# Patient Record
Sex: FEMALE | Race: ASIAN | Employment: UNEMPLOYED | URBAN - METROPOLITAN AREA
[De-identification: names, ages, dates, MRNs, and addresses within clinical notes are randomized per-mention and may not be internally consistent; named-entity substitution may affect disease eponyms.]

---

## 2020-06-30 ENCOUNTER — HOSPITAL ENCOUNTER (INPATIENT)
Age: 40
LOS: 2 days | Discharge: HOME | DRG: 287 | End: 2020-07-02
Attending: EMERGENCY MEDICINE | Admitting: INTERNAL MEDICINE
Payer: COMMERCIAL

## 2020-06-30 ENCOUNTER — APPOINTMENT (OUTPATIENT)
Dept: GENERAL RADIOLOGY | Age: 40
DRG: 287 | End: 2020-06-30
Payer: COMMERCIAL

## 2020-06-30 PROBLEM — I47.20 V-TACH: Status: ACTIVE | Noted: 2020-06-30

## 2020-06-30 LAB
ACETAMINOPHEN LEVEL: <5 MCG/ML (ref 10–30)
ALBUMIN SERPL-MCNC: 3.8 G/DL (ref 3.5–5.2)
ALP BLD-CCNC: 52 U/L (ref 35–104)
ALT SERPL-CCNC: 12 U/L (ref 0–32)
AMPHETAMINE SCREEN, URINE: NOT DETECTED
ANION GAP SERPL CALCULATED.3IONS-SCNC: 12 MMOL/L (ref 7–16)
AST SERPL-CCNC: 16 U/L (ref 0–31)
BACTERIA: ABNORMAL /HPF
BARBITURATE SCREEN URINE: NOT DETECTED
BASOPHILS ABSOLUTE: 0.05 E9/L (ref 0–0.2)
BASOPHILS RELATIVE PERCENT: 0.4 % (ref 0–2)
BENZODIAZEPINE SCREEN, URINE: NOT DETECTED
BILIRUB SERPL-MCNC: 0.4 MG/DL (ref 0–1.2)
BILIRUBIN URINE: NEGATIVE
BLOOD, URINE: NEGATIVE
BUN BLDV-MCNC: 7 MG/DL (ref 6–20)
C-REACTIVE PROTEIN: <0.1 MG/DL (ref 0–0.4)
CALCIUM SERPL-MCNC: 8 MG/DL (ref 8.6–10.2)
CANNABINOID SCREEN URINE: NOT DETECTED
CHLORIDE BLD-SCNC: 110 MMOL/L (ref 98–107)
CLARITY: CLEAR
CO2: 20 MMOL/L (ref 22–29)
COCAINE METABOLITE SCREEN URINE: NOT DETECTED
COLOR: YELLOW
CREAT SERPL-MCNC: 0.6 MG/DL (ref 0.5–1)
EOSINOPHILS ABSOLUTE: 0.14 E9/L (ref 0.05–0.5)
EOSINOPHILS RELATIVE PERCENT: 1.2 % (ref 0–6)
ETHANOL: <10 MG/DL (ref 0–0.08)
FENTANYL SCREEN, URINE: NOT DETECTED
GFR AFRICAN AMERICAN: >60
GFR AFRICAN AMERICAN: >60
GFR NON-AFRICAN AMERICAN: >60 ML/MIN/1.73
GFR NON-AFRICAN AMERICAN: >60 ML/MIN/1.73
GLUCOSE BLD-MCNC: 110 MG/DL (ref 74–99)
GLUCOSE BLD-MCNC: 95 MG/DL (ref 74–99)
GLUCOSE URINE: NEGATIVE MG/DL
HCG, URINE, POC: NEGATIVE
HCT VFR BLD CALC: 45.5 % (ref 34–48)
HEMOGLOBIN: 15 G/DL (ref 11.5–15.5)
IMMATURE GRANULOCYTES #: 0.04 E9/L
IMMATURE GRANULOCYTES %: 0.3 % (ref 0–5)
KETONES, URINE: NEGATIVE MG/DL
LEUKOCYTE ESTERASE, URINE: ABNORMAL
LYMPHOCYTES ABSOLUTE: 3.55 E9/L (ref 1.5–4)
LYMPHOCYTES RELATIVE PERCENT: 30.4 % (ref 20–42)
Lab: NORMAL
Lab: NORMAL
MAGNESIUM: 3.3 MG/DL (ref 1.6–2.6)
MCH RBC QN AUTO: 30.7 PG (ref 26–35)
MCHC RBC AUTO-ENTMCNC: 33 % (ref 32–34.5)
MCV RBC AUTO: 93 FL (ref 80–99.9)
METHADONE SCREEN, URINE: NOT DETECTED
MONOCYTES ABSOLUTE: 0.73 E9/L (ref 0.1–0.95)
MONOCYTES RELATIVE PERCENT: 6.3 % (ref 2–12)
NEGATIVE QC PASS/FAIL: NORMAL
NEUTROPHILS ABSOLUTE: 7.15 E9/L (ref 1.8–7.3)
NEUTROPHILS RELATIVE PERCENT: 61.4 % (ref 43–80)
NITRITE, URINE: NEGATIVE
OPIATE SCREEN URINE: NOT DETECTED
OXYCODONE URINE: NOT DETECTED
PDW BLD-RTO: 12.1 FL (ref 11.5–15)
PERFORMED ON: ABNORMAL
PH UA: 5.5 (ref 5–9)
PHENCYCLIDINE SCREEN URINE: NOT DETECTED
PLATELET # BLD: 414 E9/L (ref 130–450)
PMV BLD AUTO: 9.2 FL (ref 7–12)
POC CHLORIDE: 106 MMOL/L (ref 100–108)
POC CREATININE: 0.7 MG/DL (ref 0.5–1)
POC POTASSIUM: 3.1 MMOL/L (ref 3.5–5)
POC SODIUM: 142 MMOL/L (ref 132–146)
POSITIVE QC PASS/FAIL: NORMAL
POTASSIUM SERPL-SCNC: 3.5 MMOL/L (ref 3.5–5)
PRO-BNP: 114 PG/ML (ref 0–125)
PROCALCITONIN: 0.05 NG/ML (ref 0–0.08)
PROTEIN UA: NEGATIVE MG/DL
RBC # BLD: 4.89 E12/L (ref 3.5–5.5)
RBC UA: ABNORMAL /HPF (ref 0–2)
REASON FOR REJECTION: NORMAL
REJECTED TEST: NORMAL
SALICYLATE, SERUM: <0.3 MG/DL (ref 0–30)
SODIUM BLD-SCNC: 142 MMOL/L (ref 132–146)
SPECIFIC GRAVITY UA: <=1.005 (ref 1–1.03)
TOTAL PROTEIN: 6.8 G/DL (ref 6.4–8.3)
TRICYCLIC ANTIDEPRESSANTS SCREEN SERUM: NEGATIVE NG/ML
TROPONIN: <0.01 NG/ML (ref 0–0.03)
UROBILINOGEN, URINE: 0.2 E.U./DL
WBC # BLD: 11.7 E9/L (ref 4.5–11.5)
WBC UA: ABNORMAL /HPF (ref 0–5)

## 2020-06-30 PROCEDURE — 84132 ASSAY OF SERUM POTASSIUM: CPT

## 2020-06-30 PROCEDURE — 96375 TX/PRO/DX INJ NEW DRUG ADDON: CPT

## 2020-06-30 PROCEDURE — 6370000000 HC RX 637 (ALT 250 FOR IP): Performed by: EMERGENCY MEDICINE

## 2020-06-30 PROCEDURE — 84295 ASSAY OF SERUM SODIUM: CPT

## 2020-06-30 PROCEDURE — 84145 PROCALCITONIN (PCT): CPT

## 2020-06-30 PROCEDURE — 6360000002 HC RX W HCPCS: Performed by: EMERGENCY MEDICINE

## 2020-06-30 PROCEDURE — 36415 COLL VENOUS BLD VENIPUNCTURE: CPT

## 2020-06-30 PROCEDURE — 93005 ELECTROCARDIOGRAM TRACING: CPT | Performed by: EMERGENCY MEDICINE

## 2020-06-30 PROCEDURE — 6360000002 HC RX W HCPCS

## 2020-06-30 PROCEDURE — 80053 COMPREHEN METABOLIC PANEL: CPT

## 2020-06-30 PROCEDURE — 85025 COMPLETE CBC W/AUTO DIFF WBC: CPT

## 2020-06-30 PROCEDURE — 2500000003 HC RX 250 WO HCPCS

## 2020-06-30 PROCEDURE — 82565 ASSAY OF CREATININE: CPT

## 2020-06-30 PROCEDURE — 83880 ASSAY OF NATRIURETIC PEPTIDE: CPT

## 2020-06-30 PROCEDURE — 80307 DRUG TEST PRSMV CHEM ANLYZR: CPT

## 2020-06-30 PROCEDURE — 2000000000 HC ICU R&B

## 2020-06-30 PROCEDURE — 71045 X-RAY EXAM CHEST 1 VIEW: CPT

## 2020-06-30 PROCEDURE — 2580000003 HC RX 258: Performed by: EMERGENCY MEDICINE

## 2020-06-30 PROCEDURE — 96365 THER/PROPH/DIAG IV INF INIT: CPT

## 2020-06-30 PROCEDURE — 82435 ASSAY OF BLOOD CHLORIDE: CPT

## 2020-06-30 PROCEDURE — 86140 C-REACTIVE PROTEIN: CPT

## 2020-06-30 PROCEDURE — 96366 THER/PROPH/DIAG IV INF ADDON: CPT

## 2020-06-30 PROCEDURE — 81001 URINALYSIS AUTO W/SCOPE: CPT

## 2020-06-30 PROCEDURE — G0480 DRUG TEST DEF 1-7 CLASSES: HCPCS

## 2020-06-30 PROCEDURE — 84484 ASSAY OF TROPONIN QUANT: CPT

## 2020-06-30 PROCEDURE — 83735 ASSAY OF MAGNESIUM: CPT

## 2020-06-30 PROCEDURE — 82947 ASSAY GLUCOSE BLOOD QUANT: CPT

## 2020-06-30 PROCEDURE — 99291 CRITICAL CARE FIRST HOUR: CPT

## 2020-06-30 RX ORDER — METOPROLOL TARTRATE 5 MG/5ML
INJECTION INTRAVENOUS
Status: COMPLETED
Start: 2020-06-30 | End: 2020-06-30

## 2020-06-30 RX ORDER — METOPROLOL TARTRATE 5 MG/5ML
2.5 INJECTION INTRAVENOUS ONCE
Status: COMPLETED | OUTPATIENT
Start: 2020-06-30 | End: 2020-06-30

## 2020-06-30 RX ORDER — POTASSIUM CHLORIDE 7.45 MG/ML
10 INJECTION INTRAVENOUS ONCE
Status: COMPLETED | OUTPATIENT
Start: 2020-06-30 | End: 2020-06-30

## 2020-06-30 RX ORDER — 0.9 % SODIUM CHLORIDE 0.9 %
1000 INTRAVENOUS SOLUTION INTRAVENOUS ONCE
Status: COMPLETED | OUTPATIENT
Start: 2020-06-30 | End: 2020-06-30

## 2020-06-30 RX ORDER — ACETAMINOPHEN 650 MG/1
650 SUPPOSITORY RECTAL EVERY 6 HOURS PRN
Status: DISCONTINUED | OUTPATIENT
Start: 2020-06-30 | End: 2020-07-02

## 2020-06-30 RX ORDER — POTASSIUM CHLORIDE 20 MEQ/1
40 TABLET, EXTENDED RELEASE ORAL ONCE
Status: COMPLETED | OUTPATIENT
Start: 2020-06-30 | End: 2020-06-30

## 2020-06-30 RX ORDER — ACETAMINOPHEN 325 MG/1
650 TABLET ORAL EVERY 6 HOURS PRN
Status: DISCONTINUED | OUTPATIENT
Start: 2020-06-30 | End: 2020-07-02

## 2020-06-30 RX ORDER — SODIUM CHLORIDE 0.9 % (FLUSH) 0.9 %
10 SYRINGE (ML) INJECTION PRN
Status: DISCONTINUED | OUTPATIENT
Start: 2020-06-30 | End: 2020-07-02

## 2020-06-30 RX ORDER — MAGNESIUM SULFATE IN WATER 40 MG/ML
2 INJECTION, SOLUTION INTRAVENOUS ONCE
Status: COMPLETED | OUTPATIENT
Start: 2020-06-30 | End: 2020-06-30

## 2020-06-30 RX ORDER — POLYETHYLENE GLYCOL 3350 17 G/17G
17 POWDER, FOR SOLUTION ORAL DAILY PRN
Status: DISCONTINUED | OUTPATIENT
Start: 2020-06-30 | End: 2020-07-02

## 2020-06-30 RX ORDER — ASPIRIN 81 MG/1
324 TABLET, CHEWABLE ORAL ONCE
Status: COMPLETED | OUTPATIENT
Start: 2020-06-30 | End: 2020-06-30

## 2020-06-30 RX ORDER — ONDANSETRON 2 MG/ML
4 INJECTION INTRAMUSCULAR; INTRAVENOUS EVERY 6 HOURS PRN
Status: DISCONTINUED | OUTPATIENT
Start: 2020-06-30 | End: 2020-07-01

## 2020-06-30 RX ORDER — PROMETHAZINE HYDROCHLORIDE 25 MG/1
12.5 TABLET ORAL EVERY 6 HOURS PRN
Status: DISCONTINUED | OUTPATIENT
Start: 2020-06-30 | End: 2020-07-01

## 2020-06-30 RX ORDER — SODIUM CHLORIDE 0.9 % (FLUSH) 0.9 %
10 SYRINGE (ML) INJECTION EVERY 12 HOURS SCHEDULED
Status: DISCONTINUED | OUTPATIENT
Start: 2020-06-30 | End: 2020-07-02

## 2020-06-30 RX ORDER — LIDOCAINE HYDROCHLORIDE ANHYDROUS AND DEXTROSE MONOHYDRATE .4; 5 G/100ML; G/100ML
2 INJECTION, SOLUTION INTRAVENOUS CONTINUOUS
Status: DISCONTINUED | OUTPATIENT
Start: 2020-06-30 | End: 2020-07-01

## 2020-06-30 RX ADMIN — LIDOCAINE HYDROCHLORIDE 2 MG/MIN: 4 INJECTION, SOLUTION INTRAVENOUS at 19:21

## 2020-06-30 RX ADMIN — SODIUM CHLORIDE 1000 ML: 9 INJECTION, SOLUTION INTRAVENOUS at 16:57

## 2020-06-30 RX ADMIN — POTASSIUM CHLORIDE 40 MEQ: 1500 TABLET, EXTENDED RELEASE ORAL at 18:05

## 2020-06-30 RX ADMIN — POTASSIUM CHLORIDE 10 MEQ: 10 INJECTION, SOLUTION INTRAVENOUS at 18:05

## 2020-06-30 RX ADMIN — MAGNESIUM SULFATE 2 G: 2 INJECTION INTRAVENOUS at 18:05

## 2020-06-30 RX ADMIN — METOPROLOL TARTRATE 2.5 MG: 5 INJECTION INTRAVENOUS at 17:07

## 2020-06-30 RX ADMIN — MAGNESIUM SULFATE HEPTAHYDRATE 2 G: 40 INJECTION, SOLUTION INTRAVENOUS at 16:53

## 2020-06-30 RX ADMIN — ASPIRIN 81 MG 324 MG: 81 TABLET ORAL at 16:57

## 2020-06-30 ASSESSMENT — ENCOUNTER SYMPTOMS
PHOTOPHOBIA: 0
CONSTIPATION: 0
DIARRHEA: 0
RHINORRHEA: 0
COUGH: 0
ABDOMINAL DISTENTION: 0
SHORTNESS OF BREATH: 0
EYE PAIN: 0
CHEST TIGHTNESS: 0
FACIAL SWELLING: 0
EYE REDNESS: 0
NAUSEA: 0
COLOR CHANGE: 0

## 2020-06-30 ASSESSMENT — PAIN SCALES - GENERAL
PAINLEVEL_OUTOF10: 8
PAINLEVEL_OUTOF10: 0

## 2020-06-30 ASSESSMENT — PAIN DESCRIPTION - ORIENTATION: ORIENTATION: UPPER;MID

## 2020-06-30 ASSESSMENT — PAIN DESCRIPTION - ONSET: ONSET: ON-GOING

## 2020-06-30 ASSESSMENT — PAIN DESCRIPTION - PAIN TYPE: TYPE: ACUTE PAIN

## 2020-06-30 ASSESSMENT — PAIN DESCRIPTION - FREQUENCY: FREQUENCY: INTERMITTENT

## 2020-06-30 ASSESSMENT — PAIN DESCRIPTION - LOCATION: LOCATION: CHEST

## 2020-06-30 ASSESSMENT — PAIN DESCRIPTION - DESCRIPTORS: DESCRIPTORS: BURNING

## 2020-06-30 NOTE — ED NOTES
Bed: 12  Expected date:   Expected time:   Means of arrival:   Comments:  joey Grimaldo, RN  06/30/20 8916

## 2020-06-30 NOTE — ED PROVIDER NOTES
Patient is a 71-year-old female presenting to the emergency department due to palpitations and chest pain that for which she went to the urgent care. She is found at the urgent care to be in V. tach and was sent by ambulance to the emergency department. She was given 150 of amiodarone in transport which resolved her V. tach and rhythm upon arrival is a sinus with several preventricular complexes and appears to be a borderline abnormal rhythm. Patient is stable at this time. Patient really patient recently moved to the [facility] from the [facility] has no previous cardiac history no family history of cardiac disease. Symptoms are worsened by nothing       Symptoms are improved by nothing    Denies any associated loss of consciousness, chest pain, nausea, vomiting, diarrhea    Review of Systems   Constitutional: Negative for activity change, chills, diaphoresis and fatigue. HENT: Negative for congestion, facial swelling, hearing loss and rhinorrhea. Eyes: Negative for photophobia, pain and redness. Respiratory: Negative for cough, chest tightness and shortness of breath. Cardiovascular: Positive for palpitations. Negative for chest pain and leg swelling. Gastrointestinal: Negative for abdominal distention, constipation, diarrhea and nausea. Genitourinary: Negative for difficulty urinating, dysuria, frequency and hematuria. Musculoskeletal: Negative for arthralgias, joint swelling and myalgias. Skin: Negative for color change, pallor and rash. Neurological: Positive for light-headedness. Negative for numbness and headaches. Hematological: Negative for adenopathy. Physical Exam  Vitals signs and nursing note reviewed. Constitutional:       Appearance: She is well-developed and normal weight. She is ill-appearing. HENT:      Head: Normocephalic and atraumatic.       Right Ear: Tympanic membrane normal.      Left Ear: Tympanic membrane normal.      Nose: Nose normal. No congestion. Mouth/Throat:      Mouth: Mucous membranes are moist.      Pharynx: Oropharynx is clear. Eyes:      Pupils: Pupils are equal, round, and reactive to light. Neck:      Musculoskeletal: Normal range of motion and neck supple. Cardiovascular:      Rate and Rhythm: Normal rate and regular rhythm. Pulses: Normal pulses. Heart sounds: No murmur. Pulmonary:      Effort: Pulmonary effort is normal. No respiratory distress. Breath sounds: Normal breath sounds. No wheezing or rales. Abdominal:      General: Bowel sounds are normal.      Palpations: Abdomen is soft. Tenderness: There is no abdominal tenderness. There is no guarding or rebound. Skin:     General: Skin is warm and dry. Neurological:      Mental Status: She is alert and oriented to person, place, and time. Cranial Nerves: No cranial nerve deficit. Coordination: Coordination normal.          Procedures     EKG: Pre-arrival EKG by EMS  This EKG is signed and interpreted by me. Rate: 120  Rhythm: Sinus tach with nonsustained ventricular tachycardia  Interpretation: Sinus tach with a run of V. tach  Comparison: no previous EKG    EK  This EKG is signed and interpreted by me. Rate: 91  Rhythm: Sinus and frequent PVCs  Interpretation: no acute changes  Comparison: stable as compared to patient's most recent EKG         MDM  Number of Diagnoses or Management Options  Diagnosis management comments: Patient presented with spontaneous runs of V. tach. She was given 150 of amiodarone by EMS prior to arrival to emergency department. Serial EKGs were conducted in the emergency department after arrival.  The first EKG conducted at 1640 showed a sinus rhythm with occasional premature ventricular complexes with a QTC of 462. Serial EKGs were conducted every 10 to 15 minutes which continue to show a prolonging QTC.   Throughout this time she continued to have a sinus rhythm with frequent PVCs however her Ada Lukasz, DO       --------------------------------------------- PAST HISTORY ---------------------------------------------  Past Medical History:  has no past medical history on file. Past Surgical History:  has no past surgical history on file. Social History:      Family History: family history is not on file. The patients home medications have been reviewed. Allergies: Patient has no known allergies.     -------------------------------------------------- RESULTS -------------------------------------------------    Lab  Results for orders placed or performed during the hospital encounter of 06/30/20   CBC Auto Differential   Result Value Ref Range    WBC 11.7 (H) 4.5 - 11.5 E9/L    RBC 4.89 3.50 - 5.50 E12/L    Hemoglobin 15.0 11.5 - 15.5 g/dL    Hematocrit 45.5 34.0 - 48.0 %    MCV 93.0 80.0 - 99.9 fL    MCH 30.7 26.0 - 35.0 pg    MCHC 33.0 32.0 - 34.5 %    RDW 12.1 11.5 - 15.0 fL    Platelets 802 738 - 876 E9/L    MPV 9.2 7.0 - 12.0 fL    Neutrophils % 61.4 43.0 - 80.0 %    Immature Granulocytes % 0.3 0.0 - 5.0 %    Lymphocytes % 30.4 20.0 - 42.0 %    Monocytes % 6.3 2.0 - 12.0 %    Eosinophils % 1.2 0.0 - 6.0 %    Basophils % 0.4 0.0 - 2.0 %    Neutrophils Absolute 7.15 1.80 - 7.30 E9/L    Immature Granulocytes # 0.04 E9/L    Lymphocytes Absolute 3.55 1.50 - 4.00 E9/L    Monocytes Absolute 0.73 0.10 - 0.95 E9/L    Eosinophils Absolute 0.14 0.05 - 0.50 E9/L    Basophils Absolute 0.05 0.00 - 0.20 E9/L   Urinalysis, reflex to microscopic   Result Value Ref Range    Color, UA Yellow Straw/Yellow    Clarity, UA Clear Clear    Glucose, Ur Negative Negative mg/dL    Bilirubin Urine Negative Negative    Ketones, Urine Negative Negative mg/dL    Specific Gravity, UA <=1.005 1.005 - 1.030    Blood, Urine Negative Negative    pH, UA 5.5 5.0 - 9.0    Protein, UA Negative Negative mg/dL    Urobilinogen, Urine 0.2 <2.0 E.U./dL    Nitrite, Urine Negative Negative    Leukocyte Esterase, Urine TRACE (A) 13 100 % -- --   06/30/20 1930 -- -- -- -- -- 99 % -- --   06/30/20 1915 136/87 -- -- 91 14 100 % -- --   06/30/20 1912 (!) 141/91 -- -- 83 18 100 % -- --   06/30/20 1830 123/82 -- -- 81 17 100 % -- --   06/30/20 1800 126/83 -- -- 86 17 99 % -- --   06/30/20 1730 120/75 -- -- 81 16 99 % -- --   06/30/20 1711 (!) 145/86 -- -- 89 18 99 % -- --   06/30/20 1648 (!) 141/100 -- -- 100 18 100 % 5' 2\" (1.575 m) 120 lb (54.4 kg)       Oxygen Saturation Interpretation: Normal      ------------------------------------------ PROGRESS NOTES ------------------------------------------  Re-evaluation(s):  Time: 1700. Patients symptoms show no change  Repeat physical examination is not changed    Time: 1900. Patients symptoms are worsening  Repeat physical examination is worsened    I have spoken with the patient and discussed todays results, in addition to providing specific details for the plan of care and counseling regarding the diagnosis and prognosis. Their questions are answered at this time and they are agreeable with the plan.      --------------------------------- ADDITIONAL PROVIDER NOTES ---------------------------------  Consultations:  Spoke with Dr. Camille Guaman intensivist,  They will provide consultation. Spoke with Dr. Anabel leal,  They will provide consultation. Spoke with Dr. Marcella Swain,  They will admit this patient. This patient's ED course included: a personal history and physicial examination, re-evaluation prior to disposition, multiple bedside re-evaluations, IV medications, cardiac monitoring, continuous pulse oximetry, complex medical decision making and emergency management and Multiple EKGs and multiple consultations     This patient has initially deteriorated, but then stabilized during their ED course. Please note that the withdrawal or failure to initiate urgent interventions for this patient would likely result in a life threatening deterioration or permanent disability. Accordingly this patient received 30 minutes of critical care time, excluding separately billable procedures. Clinical Impression  1. V-tach ([address]    2. Palpitations          Disposition  Patient's disposition: Admit to CCU/ICU  Patient's condition is stable.             Stella Kimbrough DO  Resident  06/30/20 2300

## 2020-07-01 ENCOUNTER — APPOINTMENT (OUTPATIENT)
Dept: CT IMAGING | Age: 40
DRG: 287 | End: 2020-07-01
Payer: COMMERCIAL

## 2020-07-01 LAB
ALBUMIN SERPL-MCNC: 2.7 G/DL (ref 3.5–5.2)
ALP BLD-CCNC: 36 U/L (ref 35–104)
ALT SERPL-CCNC: 10 U/L (ref 0–32)
ANION GAP SERPL CALCULATED.3IONS-SCNC: 6 MMOL/L (ref 7–16)
ANTI DNA DOUBLE STRANDED: NEGATIVE
ANTI-NUCLEAR ANTIBODY (ANA): NEGATIVE
AST SERPL-CCNC: 17 U/L (ref 0–31)
BASOPHILS ABSOLUTE: 0.06 E9/L (ref 0–0.2)
BASOPHILS RELATIVE PERCENT: 0.5 % (ref 0–2)
BILIRUB SERPL-MCNC: 0.5 MG/DL (ref 0–1.2)
BUN BLDV-MCNC: 7 MG/DL (ref 6–20)
CALCIUM IONIZED: 1.18 MMOL/L (ref 1.15–1.33)
CALCIUM SERPL-MCNC: 6.4 MG/DL (ref 8.6–10.2)
CHLORIDE BLD-SCNC: 115 MMOL/L (ref 98–107)
CHOLESTEROL, TOTAL: 113 MG/DL (ref 0–199)
CO2: 19 MMOL/L (ref 22–29)
CREAT SERPL-MCNC: 0.6 MG/DL (ref 0.5–1)
EKG ATRIAL RATE: 75 BPM
EKG ATRIAL RATE: 79 BPM
EKG ATRIAL RATE: 80 BPM
EKG ATRIAL RATE: 84 BPM
EKG ATRIAL RATE: 85 BPM
EKG ATRIAL RATE: 88 BPM
EKG ATRIAL RATE: 91 BPM
EKG ATRIAL RATE: 97 BPM
EKG P AXIS: 67 DEGREES
EKG P AXIS: 68 DEGREES
EKG P AXIS: 72 DEGREES
EKG P AXIS: 73 DEGREES
EKG P AXIS: 74 DEGREES
EKG P AXIS: 75 DEGREES
EKG P AXIS: 76 DEGREES
EKG P AXIS: 76 DEGREES
EKG P-R INTERVAL: 174 MS
EKG P-R INTERVAL: 180 MS
EKG P-R INTERVAL: 182 MS
EKG P-R INTERVAL: 182 MS
EKG P-R INTERVAL: 184 MS
EKG P-R INTERVAL: 188 MS
EKG P-R INTERVAL: 194 MS
EKG P-R INTERVAL: 196 MS
EKG Q-T INTERVAL: 376 MS
EKG Q-T INTERVAL: 382 MS
EKG Q-T INTERVAL: 402 MS
EKG Q-T INTERVAL: 416 MS
EKG Q-T INTERVAL: 424 MS
EKG Q-T INTERVAL: 428 MS
EKG Q-T INTERVAL: 432 MS
EKG Q-T INTERVAL: 444 MS
EKG QRS DURATION: 88 MS
EKG QRS DURATION: 90 MS
EKG QRS DURATION: 92 MS
EKG QRS DURATION: 92 MS
EKG QRS DURATION: 94 MS
EKG QRS DURATION: 94 MS
EKG QRS DURATION: 96 MS
EKG QRS DURATION: 96 MS
EKG QTC CALCULATION (BAZETT): 462 MS
EKG QTC CALCULATION (BAZETT): 485 MS
EKG QTC CALCULATION (BAZETT): 486 MS
EKG QTC CALCULATION (BAZETT): 489 MS
EKG QTC CALCULATION (BAZETT): 491 MS
EKG QTC CALCULATION (BAZETT): 495 MS
EKG QTC CALCULATION (BAZETT): 495 MS
EKG QTC CALCULATION (BAZETT): 509 MS
EKG R AXIS: 20 DEGREES
EKG R AXIS: 47 DEGREES
EKG R AXIS: 48 DEGREES
EKG R AXIS: 50 DEGREES
EKG R AXIS: 53 DEGREES
EKG R AXIS: 58 DEGREES
EKG R AXIS: 63 DEGREES
EKG R AXIS: 65 DEGREES
EKG T AXIS: 64 DEGREES
EKG T AXIS: 66 DEGREES
EKG T AXIS: 67 DEGREES
EKG T AXIS: 68 DEGREES
EKG VENTRICULAR RATE: 75 BPM
EKG VENTRICULAR RATE: 79 BPM
EKG VENTRICULAR RATE: 80 BPM
EKG VENTRICULAR RATE: 84 BPM
EKG VENTRICULAR RATE: 85 BPM
EKG VENTRICULAR RATE: 88 BPM
EKG VENTRICULAR RATE: 91 BPM
EKG VENTRICULAR RATE: 97 BPM
EOSINOPHILS ABSOLUTE: 0.21 E9/L (ref 0.05–0.5)
EOSINOPHILS RELATIVE PERCENT: 1.7 % (ref 0–6)
GFR AFRICAN AMERICAN: >60
GFR NON-AFRICAN AMERICAN: >60 ML/MIN/1.73
GLUCOSE BLD-MCNC: 86 MG/DL (ref 74–99)
HBA1C MFR BLD: 4.7 % (ref 4–5.6)
HCT VFR BLD CALC: 44.3 % (ref 34–48)
HDLC SERPL-MCNC: 42 MG/DL
HEMOGLOBIN: 14.1 G/DL (ref 11.5–15.5)
IMMATURE GRANULOCYTES #: 0.04 E9/L
IMMATURE GRANULOCYTES %: 0.3 % (ref 0–5)
LDL CHOLESTEROL CALCULATED: 60 MG/DL (ref 0–99)
LYMPHOCYTES ABSOLUTE: 3.39 E9/L (ref 1.5–4)
LYMPHOCYTES RELATIVE PERCENT: 27.6 % (ref 20–42)
MAGNESIUM: 4 MG/DL (ref 1.6–2.6)
MCH RBC QN AUTO: 30.7 PG (ref 26–35)
MCHC RBC AUTO-ENTMCNC: 31.8 % (ref 32–34.5)
MCV RBC AUTO: 96.3 FL (ref 80–99.9)
MONOCYTES ABSOLUTE: 0.72 E9/L (ref 0.1–0.95)
MONOCYTES RELATIVE PERCENT: 5.9 % (ref 2–12)
NEUTROPHILS ABSOLUTE: 7.87 E9/L (ref 1.8–7.3)
NEUTROPHILS RELATIVE PERCENT: 64 % (ref 43–80)
PDW BLD-RTO: 11.7 FL (ref 11.5–15)
PHOSPHORUS: 2.4 MG/DL (ref 2.5–4.5)
PLATELET # BLD: 353 E9/L (ref 130–450)
PMV BLD AUTO: 9.2 FL (ref 7–12)
POTASSIUM SERPL-SCNC: 3.7 MMOL/L (ref 3.5–5)
RBC # BLD: 4.6 E12/L (ref 3.5–5.5)
REASON FOR REJECTION: NORMAL
REJECTED TEST: NORMAL
RHEUMATOID FACTOR: <10 IU/ML (ref 0–13)
SEDIMENTATION RATE, ERYTHROCYTE: 0 MM/HR (ref 0–20)
SODIUM BLD-SCNC: 140 MMOL/L (ref 132–146)
TOTAL PROTEIN: 5.4 G/DL (ref 6.4–8.3)
TRIGL SERPL-MCNC: 56 MG/DL (ref 0–149)
TROPONIN: <0.01 NG/ML (ref 0–0.03)
TSH SERPL DL<=0.05 MIU/L-ACNC: 2.55 UIU/ML (ref 0.27–4.2)
VLDLC SERPL CALC-MCNC: 11 MG/DL
WBC # BLD: 12.3 E9/L (ref 4.5–11.5)

## 2020-07-01 PROCEDURE — 86200 CCP ANTIBODY: CPT

## 2020-07-01 PROCEDURE — 80061 LIPID PANEL: CPT

## 2020-07-01 PROCEDURE — 6370000000 HC RX 637 (ALT 250 FOR IP): Performed by: INTERNAL MEDICINE

## 2020-07-01 PROCEDURE — 2000000000 HC ICU R&B

## 2020-07-01 PROCEDURE — 2500000003 HC RX 250 WO HCPCS

## 2020-07-01 PROCEDURE — 80053 COMPREHEN METABOLIC PANEL: CPT

## 2020-07-01 PROCEDURE — B2111ZZ FLUOROSCOPY OF MULTIPLE CORONARY ARTERIES USING LOW OSMOLAR CONTRAST: ICD-10-PCS | Performed by: INTERNAL MEDICINE

## 2020-07-01 PROCEDURE — 99255 IP/OBS CONSLTJ NEW/EST HI 80: CPT | Performed by: INTERNAL MEDICINE

## 2020-07-01 PROCEDURE — 2700000000 HC OXYGEN THERAPY PER DAY

## 2020-07-01 PROCEDURE — 85651 RBC SED RATE NONAUTOMATED: CPT

## 2020-07-01 PROCEDURE — 83036 HEMOGLOBIN GLYCOSYLATED A1C: CPT

## 2020-07-01 PROCEDURE — 2580000003 HC RX 258: Performed by: INTERNAL MEDICINE

## 2020-07-01 PROCEDURE — 4A023N7 MEASUREMENT OF CARDIAC SAMPLING AND PRESSURE, LEFT HEART, PERCUTANEOUS APPROACH: ICD-10-PCS | Performed by: INTERNAL MEDICINE

## 2020-07-01 PROCEDURE — 93010 ELECTROCARDIOGRAM REPORT: CPT | Performed by: INTERNAL MEDICINE

## 2020-07-01 PROCEDURE — C1894 INTRO/SHEATH, NON-LASER: HCPCS

## 2020-07-01 PROCEDURE — 83735 ASSAY OF MAGNESIUM: CPT

## 2020-07-01 PROCEDURE — 99222 1ST HOSP IP/OBS MODERATE 55: CPT | Performed by: INTERNAL MEDICINE

## 2020-07-01 PROCEDURE — C1769 GUIDE WIRE: HCPCS

## 2020-07-01 PROCEDURE — 36415 COLL VENOUS BLD VENIPUNCTURE: CPT

## 2020-07-01 PROCEDURE — 82330 ASSAY OF CALCIUM: CPT

## 2020-07-01 PROCEDURE — 86225 DNA ANTIBODY NATIVE: CPT

## 2020-07-01 PROCEDURE — B2151ZZ FLUOROSCOPY OF LEFT HEART USING LOW OSMOLAR CONTRAST: ICD-10-PCS | Performed by: INTERNAL MEDICINE

## 2020-07-01 PROCEDURE — 85025 COMPLETE CBC W/AUTO DIFF WBC: CPT

## 2020-07-01 PROCEDURE — 6370000000 HC RX 637 (ALT 250 FOR IP): Performed by: NURSE PRACTITIONER

## 2020-07-01 PROCEDURE — 93458 L HRT ARTERY/VENTRICLE ANGIO: CPT | Performed by: INTERNAL MEDICINE

## 2020-07-01 PROCEDURE — 93005 ELECTROCARDIOGRAM TRACING: CPT | Performed by: INTERNAL MEDICINE

## 2020-07-01 PROCEDURE — 2500000003 HC RX 250 WO HCPCS: Performed by: INTERNAL MEDICINE

## 2020-07-01 PROCEDURE — 84443 ASSAY THYROID STIM HORMONE: CPT

## 2020-07-01 PROCEDURE — 84484 ASSAY OF TROPONIN QUANT: CPT

## 2020-07-01 PROCEDURE — 84100 ASSAY OF PHOSPHORUS: CPT

## 2020-07-01 PROCEDURE — 71250 CT THORAX DX C-: CPT

## 2020-07-01 PROCEDURE — 86038 ANTINUCLEAR ANTIBODIES: CPT

## 2020-07-01 PROCEDURE — 6360000002 HC RX W HCPCS

## 2020-07-01 PROCEDURE — 86431 RHEUMATOID FACTOR QUANT: CPT

## 2020-07-01 PROCEDURE — 2709999900 HC NON-CHARGEABLE SUPPLY

## 2020-07-01 RX ORDER — POTASSIUM CHLORIDE 20 MEQ/1
40 TABLET, EXTENDED RELEASE ORAL ONCE
Status: DISCONTINUED | OUTPATIENT
Start: 2020-07-01 | End: 2020-07-01

## 2020-07-01 RX ORDER — POTASSIUM CHLORIDE 20 MEQ/1
20 TABLET, EXTENDED RELEASE ORAL ONCE
Status: COMPLETED | OUTPATIENT
Start: 2020-07-01 | End: 2020-07-01

## 2020-07-01 RX ORDER — METOPROLOL SUCCINATE 50 MG/1
25 TABLET, EXTENDED RELEASE ORAL 2 TIMES DAILY
Status: DISCONTINUED | OUTPATIENT
Start: 2020-07-01 | End: 2020-07-02

## 2020-07-01 RX ADMIN — POTASSIUM CHLORIDE 20 MEQ: 1500 TABLET, EXTENDED RELEASE ORAL at 10:32

## 2020-07-01 RX ADMIN — METOPROLOL SUCCINATE 25 MG: 50 TABLET, EXTENDED RELEASE ORAL at 20:53

## 2020-07-01 RX ADMIN — ACETAMINOPHEN 650 MG: 325 TABLET ORAL at 17:25

## 2020-07-01 RX ADMIN — Medication 10 ML: at 00:16

## 2020-07-01 RX ADMIN — METOPROLOL SUCCINATE 25 MG: 50 TABLET, EXTENDED RELEASE ORAL at 10:55

## 2020-07-01 RX ADMIN — POTASSIUM PHOSPHATE, MONOBASIC POTASSIUM PHOSPHATE, DIBASIC 10 MMOL: 224; 236 INJECTION, SOLUTION, CONCENTRATE INTRAVENOUS at 08:32

## 2020-07-01 RX ADMIN — Medication 10 ML: at 10:33

## 2020-07-01 RX ADMIN — Medication 10 ML: at 20:55

## 2020-07-01 ASSESSMENT — PAIN SCALES - GENERAL
PAINLEVEL_OUTOF10: 3
PAINLEVEL_OUTOF10: 0

## 2020-07-01 NOTE — CONSULTS
Medical Intensive Care Unit     Priya Schulte  Resident History and Physical    Date and time: 6/30/2020 11:21 PM  Patient's name:  Alessia Hassan Record Number: [id number]  Patient's account/billing number: [de-identified]  Patient's YOB: 1980  Age: 38 y.o. Date of Admission: 6/30/2020  4:46 PM  Length of stay during current admission: 0    Primary Care Physician: No primary care provider on file. ICU Attending Physician: Dr. Jacque Olivas    Code Status: Full Code    Reason for ICU admission: Vtach    History of Present Illness:   50-year-old female with a past medical history of hypertension, asthma presented to the emergency department from urgent care with concerns for ventricular tachycardia. 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 Most recently 2 days ago patient had another episode. Patient's  checked her blood sugars and noted that they were normal.  There episode this a.m. accompanied with palpitations. Patient went to an urgent care where she was noted to have V. Tach. She was sent by ambulance to the emergency department. Patient was given 150 mg of amiodarone in ambulance which resolved her V. Tach. In the emergency department patient was noted to have sinus rhythm with occasional PVCs initially. Then patient slowly started having a long QTc and short runs of V. Tach. She was ultimately started on lidocaine drip in the emergency department and electrophysiology was consulted. On presentation patient was noted to be hemodynamically stable with normal heart rate and saturating from 97 to 100% on room air. Patient's labs are significant for potassium of 3.5. Troponin < 0.1, urine and serum drug screen were negative.   Patient was transferred to ICU for further care. Patient stated that her 77-year-old father was recently diagnosed with a heart disease. She has no other family members with cardiac problems. Patient states that she drinks alcohol occasionally, does not use any illicit drugs, denies smoking cigarettes. CURRENT VENTILATION STATUS:   [] Ventilator  [] BIPAP  [] Nasal Cannula [x] Room Air      IF INTUBATED, ET TUBE MARKING AT LOWER LIP:       cms    SECRETIONS   Amount:  [] Small [] Moderate  [] Large [x] None    Color:     [] White [] Colored  [] Bloody    SEDATION:  RAAS Score:  [] Propofol gtt  [] Versed gtt  [] Ativan gtt   [x] No Sedation    PARALYZED:  [x] No    [] Yes    VASOPRESSORS:  [x] No    [] Yes    If yes -   [] Levophed       [] Dopamine     [] Vasopressin       [] Dobutamine  [] Phenylephrine         [] Epinephrine    CENTRAL LINES:     [x] No   [] Yes   (Date of Insertion:   )           If yes -     [] Right IJ     [] Left IJ [] Right Femoral [] Left Femoral                   [] Right Subclavian [] Left Subclavian     STROUD'S CATHETER:   [] No   [x] Yes  (Date of Insertion:   )     URINE OUTPUT:            [x] Good   [] Low              [] Anuric    REVIEW OF SYSTEMS:    · Constitutional: (+) dizziness, No fever, no chills, no change in weight; good appetite  · HEENT: No blurred vision, no ear problems, no sore throat, no rhinorrhea. · Respiratory: No cough, no sputum production, no pleuritic chest pain, no shortness of breath  · Cardiology: (+) palpitation, No angina, no dyspnea on exertion, no paroxysmal nocturnal dyspnea, no orthopnea,  no leg swelling. · Gastroenterology: No dysphagia, no reflux; no abdominal pain, no nausea or vomiting; no constipation or diarrhea.  No hematochezia   · Genitourinary: No dysuria, no frequency, hesitancy; no hematuria  · Musculoskeletal: no joint pain, no myalgia, no change in range of movement  · Neurology: no focal weakness in extremities, no slurred speech, no double vision, no tingling or numbness sensation  · Endocrinology: no temperature intolerance, no polyphagia, polydipsia or polyuria  · Hematology: no increased bleeding, no bruising, no lymphadenopathy  · Skin: no skin changes noticed by patient  · Psychology: no depressed mood, no suicidal ideation    OBJECTIVE:     VITAL SIGNS:  /84   Pulse 81   Temp 97.8 °F (36.6 °C) (Oral)   Resp 24   Ht 5' 2\" (1.575 m)   Wt 120 lb (54.4 kg)   SpO2 98%   BMI 21.95 kg/m²   Tmax over 24 hours:  Temp (24hrs), Av.9 °F (36.6 °C), Min:97.8 °F (36.6 °C), Max:98 °F (36.7 °C)      Patient Vitals for the past 6 hrs:   BP Temp Temp src Pulse Resp SpO2   20 2301 132/84 -- -- 81 24 98 %   20 2215 117/84 97.8 °F (36.6 °C) Oral 74 15 97 %   20 220 125/80 -- -- 70 17 --   20 2145 126/80 -- -- 76 16 --   200 125/79 -- -- 83 12 --   204 -- 98 °F (36.7 °C) Oral -- -- --   20 128/69 -- -- 80 16 99 %   20 (!) 144/92 -- -- 86 17 99 %   20 138/87 -- -- 85 17 100 %   20 119/82 -- -- 77 17 100 %   20 124/75 -- -- 82 16 99 %   20 194 129/77 -- -- 80 13 100 %   20 -- -- -- -- -- 99 %   20 136/87 -- -- 91 14 100 %   20 (!) 141/91 -- -- 83 18 100 %   20 1830 123/82 -- -- 81 17 100 %   20 1800 126/83 -- -- 86 17 99 %   20 1730 120/75 -- -- 81 16 99 %       No intake or output data in the 24 hours ending 20 2321  Wt Readings from Last 2 Encounters:   20 120 lb (54.4 kg)     Body mass index is 21.95 kg/m². PHYSICAL EXAMINATION:  Physical Exam  Vitals signs and nursing note reviewed. Constitutional:       Appearance: Normal appearance. HENT:      Head: Normocephalic and atraumatic. Eyes:      Extraocular Movements: Extraocular movements intact. Pupils: Pupils are equal, round, and reactive to light. Cardiovascular:      Rate and Rhythm: Normal rate and regular rhythm. Pulses: Normal pulses. Heart sounds: Normal heart sounds. Pulmonary:      Effort: Pulmonary effort is normal.      Breath sounds: Normal breath sounds. Abdominal:      General: Bowel sounds are normal.      Palpations: Abdomen is soft. Skin:     General: Skin is warm. Capillary Refill: Capillary refill takes less than 2 seconds. Neurological:      General: No focal deficit present. Mental Status: She is alert and oriented to person, place, and time. Psychiatric:         Mood and Affect: Mood normal.         Behavior: Behavior normal.          Any additional physical findings:    MEDICATIONS:  Scheduled Meds:   sodium chloride flush  10 mL Intravenous 2 times per day    [START ON 7/1/2020] enoxaparin  40 mg Subcutaneous Daily     Continuous Infusions:   lidocaine 2 mg/min (06/30/20 1921)     PRN Meds:   sodium chloride flush, 10 mL, PRN  acetaminophen, 650 mg, Q6H PRN    Or  acetaminophen, 650 mg, Q6H PRN  polyethylene glycol, 17 g, Daily PRN  promethazine, 12.5 mg, Q6H PRN    Or  ondansetron, 4 mg, Q6H PRN        VENT SETTINGS (Comprehensive) (if applicable):  Vent Information  SpO2: 98 %  Additional Respiratory  Assessments  Pulse: 81  Resp: 24  SpO2: 98 %    ABGs:   No results for input(s): PH, PCO2, PO2, HCO3, BE, O2SAT in the last 72 hours.     Laboratory findings:  Complete Blood Count:   Recent Labs     06/30/20  1653   WBC 11.7*   HGB 15.0   HCT 45.5           Last 3 Blood Glucose:   Recent Labs     06/30/20  1800   GLUCOSE 95        PT/INR:  No results found for: PROTIME, INR  PTT:  No results found for: APTT, PTT    Comprehensive Metabolic Profile:   Recent Labs     06/30/20  1742 06/30/20  1800   NA  --  142   K  --  3.5   CL  --  110*   CO2  --  20*   BUN  --  7   CREATININE 0.7 0.6   GLUCOSE  --  95   CALCIUM  --  8.0*   PROT  --  6.8   LABALBU  --  3.8   BILITOT  --  0.4   ALKPHOS  --  52   AST  --  16   ALT  --  12      Magnesium:   Lab Results   Component Value Date MG 3.3 06/30/2020     Phosphorus: No results found for: PHOS  Ionized Calcium: No results found for: CAION   Troponin:   Recent Labs     06/30/20  1800   TROPONINI <0.01     Microbiology:  Cultures during this admission:     Blood cultures:                 [x] None drawn      [] Negative             []  Positive (Details:  )  Urine Culture:                   [x] None drawn      [] Negative             []  Positive (Details:  )  Sputum Culture:               [x] None drawn       [] Negative             []  Positive (Details:  )   Endotracheal aspirate:     [] None drawn       [] Negative             []  Positive (Details:  )       ASSESSMENT  And PLAN:     Assessment:    1. New onset Ventricular tachycardia    -s/p amiodarone 150 mg -> sinus with PVCs-> slowly prolonging QTCs, spontaneous runs of V. tach ->started on lidocaine drip  -likely secondary to structural disease vs infiltrative disease vs inherited channelopathies vs ischemic disease  -urine and serum drug screen negative  -EP consulted  2. Hypertension, monitor off meds at this time  3. Hx of Asthma         Plan:     -Follow Mg, P, K, Ca, TSH, ESR, CRP, pro-Luis  -Follow HbA1c, Lipid panel, TERESA, Anti-dsDNA, RF, Anti-CCP  -Follow Echo   -Keep K>4, Mg >2  -Follow EP recommendations  -Follow EKG in a.m. WEAN PER PROTOCOL:  [] No   [] Yes  [x] N/A    ICU PROPHYLAXIS:  Stress ulcer:  [x] PPI Agent  [] V1Dgfam [] Sucralfate  [] Other:  VTE:   [x] Enoxaparin  [] Unfract. Heparin Subcut  [] EPC Cuffs    NUTRITION:  [] NPO [] Tube Feeding (Specify: ) [] TPN  [x] PO (Diet: Diet NPO, After Midnight  DIET GENERAL;)    INSULIN DRIP:   [x] No   [] Yes    CONSULTATION NEEDED:   [] No   [x] Yes    FAMILY UPDATED:    [] No   [x] Yes    TRANSFER OUT OF ICU:   [x] No   [] Yes    Bruno Bowman MD PGY-1  Attending Physician: Dr. Pee Estrada  6/30/2020, 11:21 PM     I personally saw, examined and provided care for the patient.  Radiographs, labs and medication list were reviewed by me independently. I spoke with bedside nursing, therapists and consultants. Critical care services and times documented are independent of procedures and multidisciplinary rounds with Residents. Additionally comprehensive, multidisciplinary rounds were conducted with the MICU team. The case was discussed in detail and plans for care were established. Review of Residents documentation was conducted and revisions were made as appropriate. I agree with the above documented exam, problem list and plan of care with the following additions:    Admitted to the ICU for ventricular tachycardia on a lidocaine infusion  Main symptoms have been palpitations and near syncope  Workup underway   Await echo, non contrast chest CT to evaluate for any signs of sarcoid. May need cardiac MRI  Cardiac cath today normal coronaries, EF 55%  Off antiarrhythmics per EP  Monitor status in ICU overnight    36 minutes of CCT spent with the patient, reviewing the chart including imaging studies, and discussing the case with other health care professionals. This time excludes procedures.      Hardeep Gonzales MD

## 2020-07-01 NOTE — CARE COORDINATION
Patient remains in MICU on room air. I met with patient and her  Natalia Reasoner at the bedside to discuss transition of care at discharge. She lives with her  and 2 kids in a 1 floor ramp to enter home. She is independent with ADLs, doesn't work or drive- her  drives her where she needs to go, and she has no DME. Patient's pharmacy is [facility] on [address] She has no hx HHC or JONNA. Her plan is to return home at discharge and they do not anticipate any needs; however patient is currently on 15L oxygen and she does not use home O2. . I called patient's insurance and requested her benefit coverage be faxed to us; will place on soft chart when obtained. CM/SW will continue to follow for discharge needs. Update: Per patient's insurance company patient has a limited policy where hospital stays are only covered at $200/ day for a max of 30 days and she has no coverage for DME/HHC. I called and LVM with public benefits to see if they could look at this patient to see if they are eligible for any benefits.

## 2020-07-01 NOTE — PROGRESS NOTES
[address] [facility]  Department of Internal Medicine   Internal Medicine Residency   MICU Progress Note    Patient:  Gina Henley 38 y.o. female  MRN: [id number]     Date of Service: 7/1/2020    Allergy: Patient has no known allergies. Subjective        Patient was seen at bedside in am    No new complaints overnight   Performed today unremarkable   Echo pending   Endocrine, immunological and infectious work-up negative   Follow-up CT chest    Objective   I & O - 24hr:     Intake/Output Summary (Last 24 hours) at 7/1/2020 1411  Last data filed at 7/1/2020 0605  Gross per 24 hour   Intake 568 ml   Output 600 ml   Net -32 ml             Physical Exam:    · Vitals: /83   Pulse 83   Temp 98 °F (36.7 °C) (Temporal)   Resp 18   Ht 5' 2\" (1.575 m)   Wt 131 lb 12.8 oz (59.8 kg)   SpO2 97%   BMI 24.11 kg/m²       · Constitutional: Alert, AaO x3. Follows commands. In no apparent distress. · Head: Normocephalic and atraumatic. · Eyes: PERRL, (-) conjunctivitis, (-) scleral icterus. Mucus membranes moist.   · Neck: (-)  swelling, (-) JVD   · Respiratory: Lungs clear to auscultation bilaterally. (-)  wheezes, (-)  rales, (-)  rhonchi. · Cardiovascular: RRR. (-)  murmurs, (-) gallops,  (-) rubs. S1 and S2 were normal.   · GI:  Abdomen soft, (-) tenderness (-) distension  (+) BS. (-)  rebound, (-) guarding, (-) rigidity. · Extremities: Warm and well perfused. (-) clubbing, (-) cyanosis. 2+ distal pulses. (-) peripheral edema. · Neurologic:  Cranial nerves II-XII grossly intact. No focal neurological deficits.       Lines     Site Date/Day    Art line   None    TLC None    PICC None    Hemoaccess None      ABG:   No results found for: PHART, PH, VQV2CCW, PCO2, PO2ART, PO2, KLK5FVX, HCO3, BEART, BE, THGBART, THB, TGY8HWU, A7VDDNRP, O2SAT     Medications     Current Facility-Administered Medications   Medication Dose Route Frequency Provider Last Rate Last Dose    metoprolol succinate (TOPROL XL) extended release tablet 25 mg  25 mg Oral BID Deann Vanessa MD   25 mg at 07/01/20 1055    [START ON 7/2/2020] enoxaparin (LOVENOX) injection 40 mg  40 mg Subcutaneous Daily Deann Vanessa MD        sodium chloride flush 0.9 % injection 10 mL  10 mL Intravenous 2 times per day Deann Vanessa MD   10 mL at 07/01/20 1033    sodium chloride flush 0.9 % injection 10 mL  10 mL Intravenous PRN Deann Vanessa MD        acetaminophen (TYLENOL) tablet 650 mg  650 mg Oral Q6H PRN Deann Vanessa MD        Or    acetaminophen (TYLENOL) suppository 650 mg  650 mg Rectal Q6H PRN Deann Vanessa MD        polyethylene glycol [facility]) packet 17 g  17 g Oral Daily PRN Deann Vanessa MD            Labs   CBC with Differential:    Lab Results   Component Value Date    WBC 12.3 07/01/2020    RBC 4.60 07/01/2020    HGB 14.1 07/01/2020    HCT 44.3 07/01/2020     07/01/2020    MCV 96.3 07/01/2020    MCH 30.7 07/01/2020    MCHC 31.8 07/01/2020    RDW 11.7 07/01/2020    LYMPHOPCT 27.6 07/01/2020    MONOPCT 5.9 07/01/2020    BASOPCT 0.5 07/01/2020    MONOSABS 0.72 07/01/2020    LYMPHSABS 3.39 07/01/2020    EOSABS 0.21 07/01/2020    BASOSABS 0.06 07/01/2020     CMP:    Lab Results   Component Value Date     07/01/2020    K 3.7 07/01/2020     07/01/2020    CO2 19 07/01/2020    BUN 7 07/01/2020    CREATININE 0.6 07/01/2020    GFRAA >60 07/01/2020    LABGLOM >60 07/01/2020    GLUCOSE 86 07/01/2020    PROT 5.4 07/01/2020    LABALBU 2.7 07/01/2020    CALCIUM 6.4 07/01/2020    BILITOT 0.5 07/01/2020    ALKPHOS 36 07/01/2020    AST 17 07/01/2020    ALT 10 07/01/2020     Magnesium:    Lab Results   Component Value Date    MG 4.0 07/01/2020     Phosphorus:    Lab Results   Component Value Date    PHOS 2.4 07/01/2020     Last 3 Troponin:    Lab Results   Component Value Date    TROPONINI <0.01 07/01/2020    TROPONINI <0.01 06/30/2020     ABG:  No results found for: PH, PCO2, PO2, HCO3, BE, THGB, TCO2, O2SAT  HgBA1c:    Lab Results   Component Value Date    LABA1C 4.7 2020          Imaging Studies:     Xr Chest Portable    Result Date: 2020  Patient MRN: [id number] : 1980 Age:  38 years Gender: Female Order Date: 2020 5:00 PM Exam: XR CHEST PORTABLE Number of Images: 1 view Indication:   SOB SOB Comparison: None. Findings: The heart is unremarkable. The lung fields demonstrate evidence for air space disease. The aorta is unremarkable. Air space disease , at the right lung base       Resident's Assessment and Plan     Assessment:     1. New onset Ventricular tachycardia likely obstructive in nature  · s/p amiodarone 150 mg -> sinus with PVCs-> slowly prolonging QTCs, spontaneous runs of V. tach ->started on lidocaine drip  · TSH, CRP, sed rate, HbA1c, BMP, troponins, rheumatoid factor, TERESA, double-stranded DNA, procalcitonin, urine drug screen negative  2. Hypertension, monitor off meds at this time  3. Hx of Asthma         Plan:  · Follow-up echocardiogram   · Watch off lidocaine drip  · Follow-up chest CT  · Keep Mg > 2 K>4  · Continue Toprol 25 twice daily    PT/OT evaluation:  Not indicated at this time \  DVT prophylaxis/ GI prophylaxis: Lovenox / Diet  Disposition: MICU    Monserrat Barnett MD, PGY-2  Attending physician: Dr. Katie Culp    I personally saw, examined and provided care for the patient. Radiographs, labs and medication list were reviewed by me independently. I spoke with bedside nursing, therapists and consultants. Critical care services and times documented are independent of procedures and multidisciplinary rounds with Residents. Additionally comprehensive, multidisciplinary rounds were conducted with the MICU team. The case was discussed in detail and plans for care were established. Review of Residents documentation was conducted and revisions were made as appropriate. I agree with the above documented exam, problem list and plan of care with the following additions:    Seen on rounds.   Please see my addendum to the original critical care consult    Electronically signed by Johana Bautista MD on 7/1/2020 at 6:32 PM

## 2020-07-01 NOTE — PROGRESS NOTES
[facility] Quality Flow/Interdisciplinary Rounds Progress Note        Quality Flow Rounds held on July 1, 2020    Disciplines Attending:  Bedside nurse, charge nurse, , PT/OT, pharmacy, nursing unit leadership, , Medical residents    Fior Rivera was admitted on 6/30/2020  4:46 PM    Anticipated Discharge Date:       Disposition:    Wily Score:  Wily Scale Score: 21    Readmission Risk              Risk of Unplanned Readmission:        9           Discussed patient goal for the day, patient clinical progression, and barriers to discharge.   The following Goal(s) of the Day/Commitment(s) have been identified:  Continue treatment plan       Jeny Core  July 1, 2020

## 2020-07-01 NOTE — PROGRESS NOTES
Patient back in room fro cath lab. Physical assess unchanged. 2cc removed from TR band.  No bleeding noted

## 2020-07-01 NOTE — H&P
[facility]  Internal Medicine Residency Program  History and Physical    Patient:  Leah Rivera 38 y.o. female MRN: [id number]     Date of Service: 6/30/2020    Hospital Day: 1      Chief complaint: had concerns including Tachycardia (went to urgent care with palpitations on and off CP with SOB, EKG shows V-tach got 150Amiodarone over 8minutes PTA, ). History of Present Illness   41-year-old female with a past medical history of hypertension, asthma presented to the emergency department from urgent care with concerns for ventricular tachycardia. 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 Most recently 2 days ago patient had another episode. Patient's  checked her blood sugars and noted that they were normal.  There episode this a.m. accompanied with palpitations. Patient went to an urgent care where she was noted to have V. Tach. She was sent by ambulance to the emergency department. Patient was given 150 mg of amiodarone in ambulance which resolved her V. Tach. In the emergency department patient was noted to have sinus rhythm with occasional PVCs initially. Then patient slowly started having a long QTc and short runs of V. Tach. She was ultimately started on lidocaine drip in the emergency department and electrophysiology was consulted.      On presentation patient was noted to be hemodynamically stable with normal heart rate and saturating from 97 to 100% on room air. Patient's labs are significant for potassium of 3.5. Troponin < 0.1, urine and serum drug screen were negative. Patient was transferred to ICU for further care.      Patient stated that her 70-year-old father was recently diagnosed with a heart disease.   She has no other family members with cardiac problems. Patient states that she drinks alcohol occasionally, does not use any illicit drugs, denies smoking cigarettes. Past Medical History:  No past medical history on file. Past Surgical History:    No past surgical history on file. Medications Prior to Admission:    Prior to Admission medications    Not on File       Allergies:  Patient has no known allergies. Social History:   TOBACCO:   reports that she has never smoked. She has never used smokeless tobacco.  ETOH:   has no history on file for alcohol. Family History:   No family history on file. REVIEW OF SYSTEMS:    Review of Systems   Cardiovascular: Positive for palpitations. Neurological: Positive for dizziness. All other systems reviewed and are negative. ·      Physical Exam   · Vitals: /84   Pulse 81   Temp 97.8 °F (36.6 °C) (Oral)   Resp 24   Ht 5' 2\" (1.575 m)   Wt 120 lb (54.4 kg)   SpO2 98%   BMI 21.95 kg/m²     {Physical Exam  Vitals signs and nursing note reviewed. Constitutional:       Appearance: Normal appearance. HENT:      Head: Normocephalic and atraumatic. Mouth/Throat:      Mouth: Mucous membranes are moist.   Cardiovascular:      Rate and Rhythm: Normal rate and regular rhythm. Pulses: Normal pulses. Heart sounds: Normal heart sounds. Pulmonary:      Effort: Pulmonary effort is normal.      Breath sounds: Normal breath sounds. Abdominal:      General: Bowel sounds are normal.      Palpations: Abdomen is soft. Skin:     General: Skin is warm. Capillary Refill: Capillary refill takes less than 2 seconds. Neurological:      General: No focal deficit present. Mental Status: She is alert and oriented to person, place, and time.    Psychiatric:         Mood and Affect: Mood normal.         Behavior: Behavior normal.     ·    Labs and Imaging Studies   Basic Labs  Recent Labs     06/30/20  1742 06/30/20  1800   NA  --  142   K  --  3.5   CL  --  110*   CO2  --  20* BUN  --  7   CREATININE 0.7 0.6   GLUCOSE  --  95   CALCIUM  --  8.0*       Recent Labs     20  1653   WBC 11.7*   RBC 4.89   HGB 15.0   HCT 45.5   MCV 93.0   MCH 30.7   MCHC 33.0   RDW 12.1      MPV 9.2     Imaging Studies:     Xr Chest Portable    Result Date: 2020  Patient MRN: [id number] : 1980 Age:  38 years Gender: Female Order Date: 2020 5:00 PM Exam: XR CHEST PORTABLE Number of Images: 1 view Indication:   SOB SOB Comparison: None. Findings: The heart is unremarkable. The lung fields demonstrate evidence for air space disease. The aorta is unremarkable. Air space disease , at the right lung base     Resident's Assessment and Plan     Assessment:     1. New onset Ventricular tachycardia    -s/p amiodarone 150 mg -> sinus with PVCs-> slowly prolonging QTCs, spontaneous runs of V. tach ->started on lidocaine drip  -likely secondary to structural disease vs infiltrative disease vs inherited channelopathies vs ischemic disease  -urine and serum drug screen negative  -EP consulted  2. Hypertension, monitor off meds at this time  3. Hx of Asthma         Plan:     -Follow Mg, P, K, Ca, TSH, ESR, CRP, pro-Luis  -Follow HbA1c, Lipid panel, TERESA, Anti-dsDNA, RF, Anti-CCP  -Follow Echo   -Keep K>4, Mg >2  -Follow EP recommendations  -Follow EKG in a.m.     # DVT Prophylaxis: Lovenox  # Disposition: ICU    oLgan Kirk MD PGY-1   Internal medicine resident  Attending Physician: Dr. Charley Barksdale

## 2020-07-01 NOTE — OP NOTE
Operative Note      Patient: Adeola Suazo  YOB: 1980  MRN: [id number]    Date of Procedure: 7/1/2020    Indication:  1. Ventricular tachycardia  2. AUC score: 8  3. AUC indication: 58    Procedure: Left Heart Catheterization, coronary angiography, left ventriculography    Anesthesia: Versed, Fentanyl. Time sedation was administered: 11:59. I was present in the room when sedation was administered. Procedure end time: 12:24  Time spent with face to face monitoring of moderate sedation: 41 minutes    LHC performed via right radial approach using a 4 F sheath. 2.5mg of diluted Verapamil and 200mcg of nitroglycerine administered through the sheath. 3000U heparin administered IV. Findings:  Left main: 0%  stenosis  LAD: 0 %  stenosis  Circumflex: 0 %   stenosis  RCA: Dominant. 0 %  stenosis  LV angio: 55%  ejection fraction    Hemodynamics:  LV: 142 mmHg. EDP: 12 No gradient across AV. Ao: 130/94 ( 110 ). Sheath removed and TR band applied. There was good hemostasis achieved and the distal pulses were intact.      Complication: None   Estimated blood loss: 10 cc  Contrast use: 75 cc    Post op diagnosis:  Patent coronaries  Normal LVEF    PLAN:  As per EP        Electronically signed by Char Hollingsworth MD on 7/1/2020 at 12:36 PM

## 2020-07-01 NOTE — PROCEDURES
angiographic  coronary artery disease. 2.  Normal left ventricular size with no regional wall motion  abnormality noted on the 35-degree RUBIN view with an estimated ejection  fraction of 55%.         Erika Jean-Baptiste MD    D: 07/01/2020 12:47:42       T: 07/01/2020 12:55:08     [email or web address]  Job#: [id number]     Doc#: [id number]    CC:

## 2020-07-01 NOTE — PLAN OF CARE
Problem: Falls - Risk of:  Goal: Will remain free from falls  Description: Will remain free from falls  Outcome: Met This Shift     Problem: Cardiac:  Goal: Ability to maintain an adequate cardiac output will improve  Description: Ability to maintain an adequate cardiac output will improve  Outcome: Met This Shift

## 2020-07-01 NOTE — PROGRESS NOTES
[facility]  Internal Medicine Residency Program  Progress Note - House Team 2    Patient:  Rabia Mendez 38 y.o. female MRN: [id number]     Date of Service: 7/1/2020     CC: Lightheadedness, palpitations  Overnight events: Monitor captured 10 beat run of VT at [facility] Day: 2    Subjective       Patient reports that she feels well with no chest pain. Denies having any more episodes of lightheadedness or palpitations. Objective     Physical Exam:  · Vitals: /83   Pulse 83   Temp 98 °F (36.7 °C) (Temporal)   Resp 18   Ht 5' 2\" (1.575 m)   Wt 131 lb 12.8 oz (59.8 kg)   SpO2 97%   BMI 24.11 kg/m²     I & O - 24hr:     Intake/Output Summary (Last 24 hours) at 7/1/2020 1348  Last data filed at 7/1/2020 0605  Gross per 24 hour   Intake 568 ml   Output 600 ml   Net -32 ml   ·    · General Appearance: alert, appears stated age and cooperative  · HEENT:  Head: Normocephalic, no lesions, without obvious abnormality. · Lung: clear to auscultation bilaterally  · Heart: regular rate and rhythm, S1, S2 normal, no murmur, click, rub or gallop  · Abdomen: soft, non-tender; bowel sounds normal; no masses,  no organomegaly  · Extremities:  extremities normal, atraumatic, no cyanosis or edema  · Musculokeletal: No joint swelling, no muscle tenderness. ROM normal in all joints of extremities.    · Neurologic: Alert, oriented, thought content appropriate    Pertinent Labs & Imaging Studies     Lab Results   Component Value Date     07/01/2020    K 3.7 07/01/2020     (H) 07/01/2020    CO2 19 (L) 07/01/2020    BUN 7 07/01/2020    CREATININE 0.6 07/01/2020    GLUCOSE 86 07/01/2020    CALCIUM 6.4 (L) 07/01/2020    PROT 5.4 (L) 07/01/2020    LABALBU 2.7 (L) 07/01/2020    BILITOT 0.5 07/01/2020    ALKPHOS 36 07/01/2020    AST 17 07/01/2020    ALT 10 07/01/2020    LABGLOM >60 07/01/2020    GFRAA >60 07/01/2020     Recent Labs     07/01/20  0415 06/30/20  1653   WBC 12.3* 11.7*   HGB 14.1 15.0   HCT 44.3 45.5   MCV 96.3 93.0    414      DATE OF PROCEDURE:  07/01/2020  PROCEDURES:  Left heart catheterization, selective coronary angiography,  and left ventriculography. CONCLUSIONS:  1. Patent coronary arteries without any significant angiographic  coronary artery disease. 2.  Normal left ventricular size with no regional wall motion  abnormality noted on the 35-degree RUBIN view with an estimated ejection  fraction of 55%. Resident's Assessment and Plan     Maxim Becker is a 38 y.o. female with a PMHx of HTN and asthma who is being managed for ventricular tachycardia. 1. New onset monomorphic ventricular tachycardia. Improved after being started on lidocaine drip. EP suspects RVOT VT. Prolonged QTc may be secondary to amiodarone. LHC today revealed normal coronaries and LVEF 55%. - Discontinue lidocaine drip   - Start Toprol-XL 25 mg BID and uptitrate as patient tolerates   - Follow up on echocardiogram  2. Hypertension. Blood pressure has been stable off medications. - Continue to monitor  3. Hx of asthma. Has home inhaler.     PT/OT evaluation:    DVT prophylaxis/ GI prophylaxis: Lovenox    Disposition: Continue current treatment    Robin Barrera MD,  PGY-1  Attending Physician: Dr. Chet Bloom

## 2020-07-01 NOTE — CONSULTS
[address] [facility] and [address] [facility] Electrophysiology  Consultation Report  PATIENT: Rabia Mendez  MEDICAL RECORD NUMBER: [id number]  DATE OF SERVICE:  7/1/2020  ATTENDING ELECTROPHYSIOLOGIST: Dr. Errol Tolbert  PRIMARY ELECTROPHYSIOLOGIST: Dr. Errol Tolbert  REFERRING PHYSICIAN: Mona Monzon DO   CHIEF COMPLAINT: Palpitations and dizziness     HPI: This is a 38 y.o. female with a history of   Patient Active Problem List   Diagnosis    V-tach [facility])   who presents to the hospital complaining of Palpitation and Dizziness. Rabia Mendez is not known to Electrophysiology, she has a history of Asthma, she does not have a primary care provider as she recently moved from [place] to the [facility]. Last year Jan was in the in the [place] with her  during the summer after exerting herself outside with minimal hydration she experienced brief loss of consciousness, and awoke seconds later. She has no other reports of syncope, exertional chest pain, orthopnea PND. She has no familial history of sudden cardiac death. On June 30, 2020 she began to experience sudden dizziness, palpitations and chest discomfort and was initially seen in an urgent care where she was found to be in a  monomorphic ventricle tachycardia, was urgently transferred to [facility] for further management. In the emergency department she was given 150 mg amnio bolus that terminated her ventricle tachycardia and was then placed on a lidocaine drip at 2 mg per minute, she continues to have nonsustained monomorphic ventricular tachycardia. On presentation her QTC was 80 MS and has since been documented at 489ms,  EKG suggest RVOT at this time. Initial laboratory evaluation includes K 3.5, mag 3.3, TSH 2.55, WBC 12.3, UA shows rare bacteria and trace leukocytes. Chest X ray shows air space disease of the right lung base.           Cardiac electrophysiology service is consulted for Ventricular tachycardia. Patient Active Problem List    Diagnosis Date Noted   [facility]) 06/30/2020       No past medical history on file. No family history on file. No family history of SCD. Social History     Tobacco Use    Smoking status: Never Smoker    Smokeless tobacco: Never Used   Substance Use Topics    Alcohol use: Not on file       Current Facility-Administered Medications   Medication Dose Route Frequency Provider Last Rate Last Dose    potassium phosphate 10 mmol in dextrose 5 % 250 mL IVPB  10 mmol Intravenous Once Ford Layne MD 62.5 mL/hr at 07/01/20 0832 10 mmol at 07/01/20 8505    metoprolol succinate (TOPROL XL) extended release tablet 25 mg  25 mg Oral BID [place] [employer], APRN - CNP   25 mg at 07/01/20 1055    sodium chloride flush 0.9 % injection 10 mL  10 mL Intravenous 2 times per day Cadence Wolf MD   10 mL at 07/01/20 1033    sodium chloride flush 0.9 % injection 10 mL  10 mL Intravenous PRN Cadence Wolf MD        acetaminophen (TYLENOL) tablet 650 mg  650 mg Oral Q6H PRN Cadence Wolf MD        Or    acetaminophen (TYLENOL) suppository 650 mg  650 mg Rectal Q6H PRN Cadence Wolf MD        polyethylene glycol (GLYCOLAX) packet 17 g  17 g Oral Daily PRN Cadence Wolf MD        [Held by provider] enoxaparin (LOVENOX) injection 40 mg  40 mg Subcutaneous Daily Cadence Wolf MD            No Known Allergies    ROS:   Constitutional: Negative for fever, activity change and appetite change. HENT: Negative for epistaxis. Eyes: Negative for diploplia, blurred vision. Respiratory: Negative for cough, chest tightness, shortness of breath. + for wheezing   Cardiovascular: pertinent positives in HPI  Gastrointestinal: Negative for abdominal pain and blood in stool.    All other review of systems are negative     PHYSICAL EXAM:   Vitals:    07/01/20 0400 07/01/20 0500 07/01/20 0600 07/01/20 1055   BP: 119/72 125/78 121/84 121/83   Pulse: 62 69 79 83   Resp: 15 12 12    Temp:       TempSrc:       SpO2: 99% 100% 100%    Weight:       Height:           Constitutional: In NAD,  no acute distress, up in chair,  Head: Normocephalic and atraumatic. Neck: No hepatojugular reflux and no JVD present  Cardiovascular: S1 , S2 present, RRR  Pulmonary/Chest:  No respiratory distress. Abdominal: Soft. Normal appearance   Musculoskeletal: Normal range of motion of all extremities  Neurological: Alert    Skin: Skin is warm and dry. Extremity:   No edema. Psychiatric: Normal mood and affect. Thought content normal.       I have personally reviewed the laboratory, cardiac diagnostic and radiographic testing as outlined below:    Data:    Recent Labs     06/30/20  1653 07/01/20  0415   WBC 11.7* 12.3*   HGB 15.0 14.1   HCT 45.5 44.3    353     Recent Labs     06/30/20  1742 06/30/20  1800 07/01/20  0415   NA  --  142 140   K  --  3.5 3.7   CL  --  110* 115*   CO2  --  20* 19*   BUN  --  7 7   CREATININE 0.7 0.6 0.6   CALCIUM  --  8.0* 6.4*      Lab Results   Component Value Date    MG 4.0 07/01/2020     Recent Labs     07/01/20  0415   TSH 2.550     No results for input(s): INR in the last 72 hours. CXR 6/30/20: The heart is unremarkable. The lung fields demonstrate evidence for air space disease. The aorta is unremarkable.           Impression   Air space disease , at the right lung base         Telemetry 7/1/20: sinus with intermittent Monomorphic VT     EKG 7/1/20: Normal sinus rhythm with a QTc of 489ms. Please see scan in Cardiology. Echocardiogram:  Pending     Cardiac Catheterization: Pending        I have independently reviewed all of the ECGs and rhythm strips per above     Assessment/Plan: This is a 38 y.o. female with a history of   Patient Active Problem List   Diagnosis    V-tach [facility])    who presents with palpitations and was found to be in ventricular tachycardia.      1. Ventricular tachycardia   - EKG suggested RVOT, Monomorphic with no electrical variants    - Awaiting LHC and Echocardiogram   - One prior syncopal episode   - QTc at baseline 462ms   - 150 Amiodarone, and IV Lidocaine given in ER     2. Asthma       Recommendations to follow per Dr. Esther Bautista. Thank you for allowing me to participate in your patient's care. Please call me if there are any questions or concerns. Discussed with FRITZ Sanders. [facility]  [facility] [place] [place] [facility]rophys[facility]  11:47 AM  7/1/2020      ADDENDUM    This is a very pleasant 58-year-old female who presented to the hospital on 6/30/2020 with complaints of palpitations. She is from the [facility] and moved to the [place] about a year ago. She initially went to the urgent care with palpitations and was found to have some nonsustained VT and was brought to the emergency room. She states she also had some dizziness and some vague chest discomfort as well. Initially she had salvos of nonsustained VT that appeared to have a left bundle branch morphology, inferior axis, baseline EKG showed sinus rhythm QTc 425 ms. She received 150 mg amiodarone bolus and then was placed on lidocaine drip and continued to have some nonsustained VT. Repeat EKG showed sinus rhythm with frequent salvos of PVCs  ms. Currently she denies any chest pain, shortness of breath or dizziness. She is in the room with her . 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 Denies any history of sudden cardiac death in the family, she did have a syncopal episode about a year ago while in the hot sun after not eating all day.   This lasted for a few seconds when she was back to normal.    Social history  Denies any smoking, alcohol or illicit drug use    General: No unusual weight gain, change in exercise tolerance  Skin: No rash or itching  EENT: No vision changes or nosebleeds  Cardiovascular: No orthopnea or paroxysmal nocturnal dyspnea  Respiratory: Pos Cough  and neg sob  Gastrointestinal: No hematemesis or recent changes in bowel habits  Genitourinary: No hematuria, urgency or frequency  Musculoskeletal: No muscular weakness or joint swelling   Neurologic / Psychiatric: No incoordination or convulsions  Allergic / Immunologic/ Lymphatic / Endocrine: No anemia or bleeding tendency    Vitals:    07/01/20 1055   BP: 121/83   Pulse: 83   Resp:    Temp:    SpO2:      Constitutional: Oriented to person, place, and time. Well-developed and cooperative. Head: Normocephalic and atraumatic. Eyes: Conjunctivae are normal.  Neck: No  JVD present. Cardiovascular: S1 normal, S2 normal  A regular rhythm present. Pulmonary/Chest: Effort normal and breath sounds normal. No respiratory distress. Abdominal: Soft. Normal appearance   Musculoskeletal: Normal range of motion of all extremities, no muscle weakness. Neurological: Alert and oriented to person, place, and time. Skin: Skin is warm and dry. No bruising, no ecchymosis and no rash noted. Extremity: No clubbing or cyanosis. No edema. Psychiatric: Normal mood and affect. Thought content normal.     Labs  Cardiac catheterization 7/1/2020  CONCLUSIONS:  1. Patent coronary arteries without any significant angiographic  coronary artery disease. 2.  Normal left ventricular size with no regional wall motion  abnormality noted on the 35-degree RUBIN view with an estimated ejection  fraction of 55%. EKG today shows normal sinus rhythm, prolonged  ms, anterior T wave inversions V1 to V3     A/P    1. NSVT  She presents with salvos of nonsustained VT associated with palpitations and some dizziness but no syncope.   She did recount syncope about a year ago which sounded more vagal/dehydration.  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 Abnormal EKG  Prolonged QTC likely secondary to amiodarone effect  -Repeat EKG in the a.m.   - She does have nonspecific changes in the anterior precordium, rule out ARVD    3. Asthma  -Uses inhaler at home    4.  Leukocytosis  WBC count elevated, air space disease at Right lung base  R/o pneumonia      Kenya Khan M.D,   Cardiac Electrophysiology  [facility]

## 2020-07-02 VITALS
SYSTOLIC BLOOD PRESSURE: 122 MMHG | RESPIRATION RATE: 18 BRPM | TEMPERATURE: 97.5 F | WEIGHT: 131.8 LBS | OXYGEN SATURATION: 99 % | DIASTOLIC BLOOD PRESSURE: 67 MMHG | BODY MASS INDEX: 24.25 KG/M2 | HEART RATE: 62 BPM | HEIGHT: 62 IN

## 2020-07-02 LAB
ALBUMIN SERPL-MCNC: 3.7 G/DL (ref 3.5–5.2)
ALP BLD-CCNC: 52 U/L (ref 35–104)
ALT SERPL-CCNC: 11 U/L (ref 0–32)
ANION GAP SERPL CALCULATED.3IONS-SCNC: 10 MMOL/L (ref 7–16)
AST SERPL-CCNC: 14 U/L (ref 0–31)
BASOPHILS ABSOLUTE: 0.07 E9/L (ref 0–0.2)
BASOPHILS RELATIVE PERCENT: 0.6 % (ref 0–2)
BILIRUB SERPL-MCNC: 0.5 MG/DL (ref 0–1.2)
BUN BLDV-MCNC: 9 MG/DL (ref 6–20)
CALCIUM SERPL-MCNC: 8.4 MG/DL (ref 8.6–10.2)
CHLORIDE BLD-SCNC: 104 MMOL/L (ref 98–107)
CO2: 22 MMOL/L (ref 22–29)
CREAT SERPL-MCNC: 0.6 MG/DL (ref 0.5–1)
EKG ATRIAL RATE: 60 BPM
EKG P AXIS: 36 DEGREES
EKG P-R INTERVAL: 164 MS
EKG Q-T INTERVAL: 426 MS
EKG QRS DURATION: 88 MS
EKG QTC CALCULATION (BAZETT): 426 MS
EKG R AXIS: 68 DEGREES
EKG T AXIS: 72 DEGREES
EKG VENTRICULAR RATE: 60 BPM
EOSINOPHILS ABSOLUTE: 0.42 E9/L (ref 0.05–0.5)
EOSINOPHILS RELATIVE PERCENT: 3.4 % (ref 0–6)
GFR AFRICAN AMERICAN: >60
GFR NON-AFRICAN AMERICAN: >60 ML/MIN/1.73
GLUCOSE BLD-MCNC: 91 MG/DL (ref 74–99)
HCT VFR BLD CALC: 45.2 % (ref 34–48)
HEMOGLOBIN: 14.7 G/DL (ref 11.5–15.5)
IMMATURE GRANULOCYTES #: 0.05 E9/L
IMMATURE GRANULOCYTES %: 0.4 % (ref 0–5)
LV EF: 65 %
LVEF MODALITY: NORMAL
LYMPHOCYTES ABSOLUTE: 3.93 E9/L (ref 1.5–4)
LYMPHOCYTES RELATIVE PERCENT: 32.3 % (ref 20–42)
MAGNESIUM: 2.1 MG/DL (ref 1.6–2.6)
MCH RBC QN AUTO: 30.7 PG (ref 26–35)
MCHC RBC AUTO-ENTMCNC: 32.5 % (ref 32–34.5)
MCV RBC AUTO: 94.4 FL (ref 80–99.9)
MONOCYTES ABSOLUTE: 0.78 E9/L (ref 0.1–0.95)
MONOCYTES RELATIVE PERCENT: 6.4 % (ref 2–12)
NEUTROPHILS ABSOLUTE: 6.93 E9/L (ref 1.8–7.3)
NEUTROPHILS RELATIVE PERCENT: 56.9 % (ref 43–80)
PDW BLD-RTO: 11.5 FL (ref 11.5–15)
PHOSPHORUS: 3.4 MG/DL (ref 2.5–4.5)
PLATELET # BLD: 356 E9/L (ref 130–450)
PMV BLD AUTO: 9.3 FL (ref 7–12)
POTASSIUM SERPL-SCNC: 3.6 MMOL/L (ref 3.5–5)
RBC # BLD: 4.79 E12/L (ref 3.5–5.5)
SODIUM BLD-SCNC: 136 MMOL/L (ref 132–146)
TOTAL PROTEIN: 6.8 G/DL (ref 6.4–8.3)
WBC # BLD: 12.2 E9/L (ref 4.5–11.5)

## 2020-07-02 PROCEDURE — 99231 SBSQ HOSP IP/OBS SF/LOW 25: CPT | Performed by: INTERNAL MEDICINE

## 2020-07-02 PROCEDURE — 93306 TTE W/DOPPLER COMPLETE: CPT

## 2020-07-02 PROCEDURE — 83735 ASSAY OF MAGNESIUM: CPT

## 2020-07-02 PROCEDURE — 2580000003 HC RX 258: Performed by: INTERNAL MEDICINE

## 2020-07-02 PROCEDURE — 84100 ASSAY OF PHOSPHORUS: CPT

## 2020-07-02 PROCEDURE — 6370000000 HC RX 637 (ALT 250 FOR IP): Performed by: INTERNAL MEDICINE

## 2020-07-02 PROCEDURE — 2700000000 HC OXYGEN THERAPY PER DAY

## 2020-07-02 PROCEDURE — 99233 SBSQ HOSP IP/OBS HIGH 50: CPT | Performed by: INTERNAL MEDICINE

## 2020-07-02 PROCEDURE — 93005 ELECTROCARDIOGRAM TRACING: CPT | Performed by: INTERNAL MEDICINE

## 2020-07-02 PROCEDURE — 6360000002 HC RX W HCPCS: Performed by: INTERNAL MEDICINE

## 2020-07-02 PROCEDURE — 85025 COMPLETE CBC W/AUTO DIFF WBC: CPT

## 2020-07-02 PROCEDURE — 80053 COMPREHEN METABOLIC PANEL: CPT

## 2020-07-02 PROCEDURE — 36415 COLL VENOUS BLD VENIPUNCTURE: CPT

## 2020-07-02 RX ORDER — METOPROLOL SUCCINATE 25 MG/1
37.5 TABLET, EXTENDED RELEASE ORAL 2 TIMES DAILY
Status: DISCONTINUED | OUTPATIENT
Start: 2020-07-02 | End: 2020-07-02 | Stop reason: HOSPADM

## 2020-07-02 RX ORDER — POTASSIUM CHLORIDE 20 MEQ/1
40 TABLET, EXTENDED RELEASE ORAL 2 TIMES DAILY WITH MEALS
Status: DISCONTINUED | OUTPATIENT
Start: 2020-07-02 | End: 2020-07-02

## 2020-07-02 RX ORDER — METOPROLOL SUCCINATE 25 MG/1
37.5 TABLET, EXTENDED RELEASE ORAL 2 TIMES DAILY
Qty: 30 TABLET | Refills: 3 | Status: SHIPPED | OUTPATIENT
Start: 2020-07-02 | End: 2020-08-03 | Stop reason: SDUPTHER

## 2020-07-02 RX ADMIN — Medication 10 ML: at 09:53

## 2020-07-02 RX ADMIN — ACETAMINOPHEN 650 MG: 325 TABLET ORAL at 14:14

## 2020-07-02 RX ADMIN — ENOXAPARIN SODIUM 40 MG: 40 INJECTION SUBCUTANEOUS at 09:52

## 2020-07-02 RX ADMIN — METOPROLOL SUCCINATE 25 MG: 50 TABLET, EXTENDED RELEASE ORAL at 09:52

## 2020-07-02 RX ADMIN — POTASSIUM CHLORIDE 40 MEQ: 1500 TABLET, EXTENDED RELEASE ORAL at 09:52

## 2020-07-02 ASSESSMENT — PAIN DESCRIPTION - PROGRESSION: CLINICAL_PROGRESSION: NOT CHANGED

## 2020-07-02 ASSESSMENT — PAIN - FUNCTIONAL ASSESSMENT: PAIN_FUNCTIONAL_ASSESSMENT: ACTIVITIES ARE NOT PREVENTED

## 2020-07-02 ASSESSMENT — PAIN DESCRIPTION - ORIENTATION: ORIENTATION: ANTERIOR

## 2020-07-02 ASSESSMENT — PAIN SCALES - GENERAL
PAINLEVEL_OUTOF10: 4
PAINLEVEL_OUTOF10: 0

## 2020-07-02 ASSESSMENT — PAIN DESCRIPTION - DESCRIPTORS: DESCRIPTORS: ACHING;HEADACHE

## 2020-07-02 ASSESSMENT — PAIN DESCRIPTION - LOCATION: LOCATION: HEAD

## 2020-07-02 ASSESSMENT — PAIN DESCRIPTION - ONSET: ONSET: GRADUAL

## 2020-07-02 ASSESSMENT — PAIN DESCRIPTION - FREQUENCY: FREQUENCY: INTERMITTENT

## 2020-07-02 ASSESSMENT — PAIN DESCRIPTION - PAIN TYPE: TYPE: ACUTE PAIN

## 2020-07-02 NOTE — PLAN OF CARE
Problem: Falls - Risk of:  Goal: Absence of physical injury  Description: Absence of physical injury  Outcome: Met This Shift     Problem: Cardiac:  Goal: Ability to maintain an adequate cardiac output will improve  Description: Ability to maintain an adequate cardiac output will improve  Outcome: Met This Shift  Goal: Hemodynamic stability will improve  Description: Hemodynamic stability will improve  Outcome: Met This Shift     Problem: Pain:  Goal: Pain level will decrease  Description: Pain level will decrease  Outcome: Met This Shift  Goal: Control of acute pain  Description: Control of acute pain  Outcome: Met This Shift  Goal: Control of chronic pain  Description: Control of chronic pain  Outcome: Met This Shift

## 2020-07-02 NOTE — CARE COORDINATION
Discharge order noted. Voicemail message left for Christy Mar with [address] phone# [phone]  to see if he will be putting life vest on patient today before discharge. Await call back.   Kurtis Ornelas RN CM

## 2020-07-02 NOTE — PROGRESS NOTES
[facility] ELECTROPHYSIOLOGY PROGRESS NOTE    Jan Navas  1980  Date of Service: 7/2/2020  PCP: No primary care provider on file. Primary Electrophysiologist: Dr. Aylin Chu  Chief Complaint: Palpitations         Subjective: Tasha Jerez is seen in hospital follow-up for NSVT.     7/1/20: This is a very pleasant 54-year-old female who presented to the hospital on 6/30/2020 with complaints of palpitations. She is from the [facility] and moved to the [facility] about a year ago.       She initially went to the urgent care with palpitations and was found to have some nonsustained VT and was brought to the emergency room. She states she also had some dizziness and some vague chest discomfort as well.       Initially she had salvos of nonsustained VT that appeared to have a left bundle branch morphology, inferior axis, baseline EKG showed sinus rhythm QTc 425 ms. She received 150 mg amiodarone bolus and then was placed on lidocaine drip and continued to have some nonsustained VT. Repeat EKG showed sinus rhythm with frequent salvos of PVCs  ms.      Currently she denies any chest pain, shortness of breath or dizziness. She is in the room with her . 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     Denies any history of sudden cardiac death in the family, she did have a syncopal episode about a year ago while in the hot sun after not eating all day. This lasted for a few seconds when she was back to normal.    7/2/20: Tasha Jerez was seen in hospital follow up, she underwent a left heart cath yesterday with Dr. Roxie Foster that did not reveal any significant coronary coronary artery disease. She was placed on Toprol 25 mg twice daily without recurrent nonsustained VT. An echocardiogram has been performed but has not been interpreted to this point. She has no cardiac complaints at this time.       Patient Active Problem List Diagnosis    V-tach ([facility]    Palpitations    Abnormal EKG    Abnormal CXR         Scheduled Medications:   potassium chloride  40 mEq Oral BID WC    metoprolol succinate  37.5 mg Oral BID    enoxaparin  40 mg Subcutaneous Daily    sodium chloride flush  10 mL Intravenous 2 times per day       Infusion Medications:      PRN Medications:  sodium chloride flush, acetaminophen **OR** acetaminophen, polyethylene glycol    No Known Allergies    Wt Readings from Last 3 Encounters:   07/01/20 131 lb 12.8 oz (59.8 kg)     Temp Readings from Last 3 Encounters:   07/02/20 98.3 °F (36.8 °C) (Temporal)     BP Readings from Last 3 Encounters:   07/02/20 124/77     Pulse Readings from Last 3 Encounters:   07/02/20 74         Intake/Output Summary (Last 24 hours) at 7/2/2020 1313  Last data filed at 7/1/2020 2258  Gross per 24 hour   Intake 430 ml   Output 1000 ml   Net -570 ml       ROS:  Constitutional: Negative for fever, activity change and appetite change. HENT: Negative for epistaxis. Eyes: Negative for diploplia, blurred vision. Respiratory: Negative for cough, chest tightness, shortness of breath. + for wheezing   Cardiovascular: pertinent positives in HPI  Gastrointestinal: Negative for abdominal pain and blood in stool. All other review of systems are negative      Physical exam:  Constitutional: /77   Pulse 74   Temp 98.3 °F (36.8 °C) (Temporal)   Resp 16   Ht 5' 2\" (1.575 m)   Wt 131 lb 12.8 oz (59.8 kg)   SpO2 100%   BMI 24.11 kg/m²     Constitutional: In NAD,    Head: Normocephalic and atraumatic. Neck: No hepatojugular reflux and no JVD present  Cardiovascular: S1 , S2 present, RRR  Pulmonary/Chest:  No respiratory distress. Abdominal: Soft. Normal appearance   Musculoskeletal: Normal range of motion of all extremities  Neurological: Alert    Skin: Skin is warm and dry. Extremity:   No edema. Psychiatric: Normal mood and affect.  Thought content normal.     ECG 7/2/20:  Normal sinus rhythm rate 60 bpm QTc 426ms    Rhythm strip review:  No recurrent NSVT, PVCs     DATE OF PROCEDURE:  07/01/2020     PROCEDURES:  Left heart catheterization, selective coronary angiography,  and left ventriculography.     The procedure was done through right radial approach using ultrasound  guidance.     The patient received intravenous Versed and intravenous fentanyl for  sedation.     INDICATION:  Ventricular tachycardia.     PRESSURES:  1. Aorta 130/94 with a mean of 110.  2.  Left ventricular systolic pressure 704. Left ventricular  end-diastolic pressure 12.  3.  There was no significant gradient across the aortic valve.     CORONARY ANGIOGRAPHY:  LEFT MAIN:  The left main artery did not appear to have any significant  angiographic disease.     LAD:  The left anterior descending artery did not appear to have any  significant angiographic disease.     LCX:  The left circumflex did not appear to have any significant  angiographic disease.     RCA:  The right coronary artery is a dominant vessel and did not appear  to have any significant angiographic disease.     LEFT VENTRICULOGRAPHY:  The left ventricle is normal in size and  contractility with an estimated ejection fraction of 55%. There was no  significant mitral regurgitation noted.     The right radial arterial sheath was removed at the end of the procedure  and a TR band was applied with adequate hemostasis and with preservation  of pulse.     The patient tolerated the procedure well and left the cardiac  catheterization laboratory in a stable condition.     CONCLUSIONS:  1. Patent coronary arteries without any significant angiographic  coronary artery disease.   2.  Normal left ventricular size with no regional wall motion  abnormality noted on the 35-degree RUBIN view with an estimated ejection  fraction of 55%.       Recent Labs     06/30/20  1653 07/01/20  0415 07/02/20  0430   WBC 11.7* 12.3* 12.2*   HGB 15.0 14.1 14.7   HCT 45.5 44.3 45.2 MCV 93.0 96.3 94.4    353 356     Recent Labs     06/30/20  1800 07/01/20  0415 07/02/20  0430    140 136   K 3.5 3.7 3.6   * 115* 104   CO2 20* 19* 22   PHOS  --  2.4* 3.4   BUN 7 7 9   CREATININE 0.6 0.6 0.6     No results for input(s): PROTIME, INR in the last 72 hours. Recent Labs     06/30/20  1800 07/01/20  1115   TROPONINI <0.01 <0.01     No results for input(s): BNP in the last 72 hours. Recent Labs     07/01/20  0415   CHOL 113   HDL 42   TRIG 56     PT/INR:  No results found for: PROTIME, INR  TSH:    Lab Results   Component Value Date    TSH 2.550 07/01/2020       Impression/recomendations:    1. NSVT  She presents with salvos of nonsustained VT associated with palpitations and some dizziness but no syncope. She did recount syncope about a year ago which sounded more vagal/dehydration.  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   -Underwent heart catheterization today that shows normal coronaries, LVEF 55%  - As she has had no documented sustained VT she does not qualify for a Lifevest at this time. - Up titrate Toprol to 37.5mg BID   -Await echocardiogram to evaluate heart chambers and valves    2. Abnormal EKG  Prolonged QTC likely secondary to amiodarone effect  - repeat EKG 7/2/20 showed normal QTc   - She does have nonspecific changes in the anterior precordium, rule out ARVD    3.  Asthma  -Uses inhaler at home     4. Leukocytosis  WBC count elevated, air space disease at Right lung base  R/o pneumonia    5. Hypokalemia         Thank you for allowing me the opportunity to participate in the care of your patient. Discussed with Dr. Veronica Russell  Electronically signed by FRITZ Long CNP on 7/2/2020 at 1:28 PM   Cardiac Electrophysiology  [facility]  7/2/2020    ADDENDUM  I personally saw, examined and provided care for the patient. Radiographs, labs and medication list were reviewed by me independently. The case was discussed in detail and plans for care were established. Review of Nurse Practitioner documentation was conducted and revisions were made as appropriate. I agree with the above documented exam, problem list and plan of care. She been well today without any complaints of dizziness syncope, chest pain.    -She has had no further VT salvos since initiation of B-blocker therapy. -Most likely RVOT nonsustained VT as evidenced by EKG on presentation.   -Her heart is structurally normal on echocardiogram, right ventricle appears normal.  Heart catheterization does not show any evidence of coronary disease  -Continue metoprolol therapy at 37.5 mg twice daily  -We will arrange for outpatient cardiac MRI  -Lifevest was not approved as she did not have sustained VT and LVEF is normal  -I will follow-up with her in clinic in a month with 24-hour Holter prior to visit. Please call if any additional questions.   We will sign off      Gerardo Burroughs M.D  [facility] Electrophysiology

## 2020-07-02 NOTE — CARE COORDINATION
Received call back from Ash alonzo from [address] is approved and he will be here within the hour to fit the patient. RN notified.   Sara Greco RN CM

## 2020-07-02 NOTE — PROGRESS NOTES
CLINICAL PHARMACY NOTE: MEDS TO [address] Select Patient?: No  Total # of Prescriptions Filled: 1   The following medications were delivered to the patient:  · Metoprolol Succinate ER 25  Total # of Interventions Completed: 2  Time Spent (min): 15    Additional Documentation:

## 2020-07-02 NOTE — PROGRESS NOTES
Patient had question regarding discharging with lifevest. Informed EP that case management had set up for a lifevest to be delivered in approximately 1 hour. Per EP patient is to discharge with the lifevest and follow-up in 1 month.

## 2020-07-02 NOTE — PROGRESS NOTES
[address] [facility]  Department of Internal Medicine   Internal Medicine Residency   MICU Progress Note    Patient:  Maegan Nearing 38 y.o. female  MRN: [id number]     Date of Service: 7/2/2020    Allergy: Patient has no known allergies. Subjective        Patient seen at bedside in a.m.  No new complaints overnight   Echo performed this morning awaiting results   CT chest negative for acute process   LifeVest placed as per EP   We will transfer out of MICU today    Objective   I & O - 24hr:     Intake/Output Summary (Last 24 hours) at 7/2/2020 1232  Last data filed at 7/1/2020 2258  Gross per 24 hour   Intake 430 ml   Output 1000 ml   Net -570 ml             Physical Exam:    · Vitals: /77   Pulse 74   Temp 98.3 °F (36.8 °C) (Temporal)   Resp 16   Ht 5' 2\" (1.575 m)   Wt 131 lb 12.8 oz (59.8 kg)   SpO2 100%   BMI 24.11 kg/m²       · Constitutional: Alert, AaO x3. Follows commands. In no apparent distress. · Head: Normocephalic and atraumatic. · Eyes: PERRL, (-) conjunctivitis, (-) scleral icterus. Mucus membranes moist.   · Neck: (-)  swelling, (-) JVD   · Respiratory: Lungs clear to auscultation bilaterally. (-)  wheezes, (-)  rales, (-)  rhonchi. · Cardiovascular: RRR. (-)  murmurs, (-) gallops,  (-) rubs. S1 and S2 were normal.   · GI:  Abdomen soft, (-) tenderness (-) distension  (+) BS. (-)  rebound, (-) guarding, (-) rigidity. · Extremities: Warm and well perfused. (-) clubbing, (-) cyanosis. 2+ distal pulses. (-) peripheral edema. · Neurologic:  Cranial nerves II-XII grossly intact. No focal neurological deficits.       Lines     Site Date/Day    Art line   None    TLC None    PICC None    Hemoaccess None      ABG:   No results found for: PHART, PH, VPW8IHI, PCO2, PO2ART, PO2, OAY6AMJ, HCO3, BEART, BE, THGBART, THB, TUG5KYW, C7AEYJVA, O2SAT     Medications     Current Facility-Administered Medications   Medication Dose Route Frequency Provider Last Rate Last Dose    potassium Results   Component Value Date    TROPONINI <0.01 2020    TROPONINI <0.01 2020     ABG:  No results found for: PH, PCO2, PO2, HCO3, BE, THGB, TCO2, O2SAT  HgBA1c:    Lab Results   Component Value Date    LABA1C 4.7 2020          Imaging Studies:     Xr Chest Portable    Result Date: 2020  Patient MRN: [id number] : 1980 Age:  38 years Gender: Female Order Date: 2020 5:00 PM Exam: XR CHEST PORTABLE Number of Images: 1 view Indication:   SOB SOB Comparison: None. Findings: The heart is unremarkable. The lung fields demonstrate evidence for air space disease. The aorta is unremarkable. Air space disease , at the right lung base       Resident's Assessment and Plan     Assessment:     1. New onset Ventricular tachycardia likely obstructive in nature  · s/p amiodarone 150 mg -> sinus with PVCs-> slowly prolonging QTCs, spontaneous runs of V. tach ->started on lidocaine drip  · TSH, CRP, sed rate, HbA1c, BMP, troponins, rheumatoid factor, TERESA, double-stranded DNA, procalcitonin, urine drug screen negative  2. Hypertension, monitor off meds at this time  3. Hx of Asthma         Plan:  · Follow-up echocardiogram   · Watch off lidocaine drip  · Keep Mg > 2 K>4  · Continue Toprol 50 twice daily  · LifeVest  · Transfer out of MICU to telemetry    PT/OT evaluation:  Not indicated at this time   DVT prophylaxis/ GI prophylaxis: Lovenox / Diet  Disposition: Transfer MICU    Waylan Duverney, MD, PGY-2  Attending physician: Dr. Junior Felix    I personally saw, examined and provided care for the patient. Radiographs, labs and medication list were reviewed by me independently. I spoke with bedside nursing, therapists and consultants. Critical care services and times documented are independent of procedures and multidisciplinary rounds with Residents.  Additionally comprehensive, multidisciplinary rounds were conducted with the MICU team. The case was discussed in detail and plans for care were established. Review of Residents documentation was conducted and revisions were made as appropriate.  I agree with the above documented exam, problem list and plan of care with the following additions:    CT chest without infiltrate or adenopathy  Cardiac cath okay  Echo with normal LV 89%, normal diastolic function, normal RV, lipomatous hypertrophy of the interatrial septum, normal valves  EP has initiated BB therapy and recommended outpatient cardiac MRI  She has not had further VT  Life vest was not approved   Okay for transfer out of the ICU    Electronically signed by Juan Lim MD on 7/2/2020 at 3:57 PM

## 2020-07-02 NOTE — CARE COORDINATION
Cardiology wrote for life vest; I called Rocky from [place]. He requested I fax patient's insurance card to [phone] which I completed.

## 2020-07-02 NOTE — DISCHARGE SUMMARY
Discharge Summary Note  Patient ID:  Hemal Odom  [id number]  70 y.o.  1980    Admit date: 6/30/2020    Discharge date and time: 7/2/20     Admitting Physician: Ashanti Mcclure MD     Discharge Physician: Dr. Leandra Garcia    Admission Diagnoses:   New onset  V-tach [facility]) [I47.2]  Hypertension  Asthma      Discharge Diagnoses:   1. V-tach  2. Hypertension  3. Asthma    Admission Condition: serious    Discharged Condition: stable    Hospital Course: Patient presented with complaints of  lightheadedness and palpitations and was found to be in ventricular tachycardia when she presented to urgent care . En route to the ED she was given 150 mg of Amiodarone by EMS, which resolved the V tach. In the ED she initially was in sinus rhythm with occasional PVCs then began having prolonged QTc and short runs of V tach. She was then started on lidocaine infusion and admitted to the MICU. She did not have recurrence of V tach after admission. LHC revealed normal coronaries and EF of 55%. CT chest was done and found no evidence of infiltrative cardiomyopathy. Echocardiogram was unremarkable. She was transitioned from lidocaine to Toprol-XL PO with no issues. EP stated that a LifeVest would not be needed at discharge since she did not have any sustained V tach. She was sent home on Toprol with follow up with EP and plans for a 24 hour Holter monitor and an outpatient cardiac MRI.     Consults: Electrophysiology cardiology    Significant Diagnostic Studies: Left heart catheterization, CT chest, echocardiogram     Treatments: Lidocaine 2g in dextrose 5% 500 mL infusion  Metoprolol succinate 25 mg    Discharge Exam:  CONSTITUTIONAL:  awake, alert, cooperative, no apparent distress, and appears stated age  ENT:  normocepalic, without obvious abnormality  LUNGS:  No increased work of breathing, good air exchange, clear to auscultation bilaterally, no crackles or wheezing  CARDIOVASCULAR:  Normal apical impulse, regular rate and rhythm, normal S1 and S2, no S3 or S4, and no murmur noted  ABDOMEN:  No scars, normal bowel sounds, soft, non-distended, non-tender, no masses palpated, no hepatosplenomegally  MUSCULOSKELETAL:  there is no redness, warmth, or swelling of the joints  NEUROLOGIC:  Awake, alert, oriented to name, place and time. Cranial nerves II-XII are grossly intact. Motor is 5 out of 5 bilaterally. Cerebellar finger to nose, heel to shin intact. Sensory is intact. Babinski down going, Romberg negative, and gait is normal.    Disposition: home    Patient Instructions: Be compliant with medication and follow up with your appointments     Discharge Medications:   Parminder Logan   Home Medication Instructions FPK:[id number]    Printed on:07/02/20 4550   Medication Information                      metoprolol succinate (TOPROL XL) 25 MG extended release tablet  Take 1.5 tablets by mouth 2 times daily                 Activity: activity as tolerated    Diet: regular diet    Wound Care: none needed    Follow-up:    Follow-up With  Details  Why  [address] [facility]  Call  to set up an outpatient MRI. [address] [place], [place] [id number] [phone]   Indy Hyatt MD    hospital follow up   [address] [address] [phone]     With Primary Care Provider within 1 week.       Electronically signed by Argenis Salazar MD on 7/2/20 at 3:10 PM EDT

## 2020-07-02 NOTE — PROGRESS NOTES
Report called to John Aponte on 8WE.  Patient and family member notified of new room and phone number in room

## 2020-07-03 LAB — CCP IGG ANTIBODIES: 6 UNITS (ref 0–19)

## 2020-07-06 ENCOUNTER — TELEPHONE (OUTPATIENT)
Dept: NON INVASIVE DIAGNOSTICS | Age: 40
End: 2020-07-06

## 2020-07-06 NOTE — TELEPHONE ENCOUNTER
Patient will call back and let me know which facility her insurance will cover MRI.  She will also need to be scheduled for 24hr HM

## 2020-07-06 NOTE — TELEPHONE ENCOUNTER
----- Message from FRITZ Chaney CNP sent at 7/2/2020  2:17 PM EDT -----  A slight change on the plan of Mrs. Navas she will still need an outpatient MRI, but she does not qualify for lifevest so she will need a 24 hour monitor prior to an outpatient follow up with Dr. Bautista Pitts in one month thanks.

## 2020-07-27 ENCOUNTER — TELEPHONE (OUTPATIENT)
Dept: ADMINISTRATIVE | Age: 40
End: 2020-07-27

## 2020-07-27 NOTE — TELEPHONE ENCOUNTER
Pt called in to schedule HFU w/ Dr Daryl Andrade.   ANJEL [facility] 7/2, please review and contact pt at [phone]

## 2020-08-03 ENCOUNTER — TELEPHONE (OUTPATIENT)
Dept: NON INVASIVE DIAGNOSTICS | Age: 40
End: 2020-08-03

## 2020-08-03 RX ORDER — METOPROLOL SUCCINATE 25 MG/1
37.5 TABLET, EXTENDED RELEASE ORAL 2 TIMES DAILY
Qty: 90 TABLET | Refills: 3 | Status: SHIPPED
Start: 2020-08-03 | End: 2020-09-10 | Stop reason: ALTCHOICE

## 2020-08-03 NOTE — TELEPHONE ENCOUNTER
Pt's  is calling back for a HFU with Dr Frank Pena. She still has the monitor on, almost complete, wearing her life vest, meds will run out in about 2 weeks.  Please call them at [phone]

## 2020-09-10 ENCOUNTER — TELEPHONE (OUTPATIENT)
Dept: NON INVASIVE DIAGNOSTICS | Age: 40
End: 2020-09-10

## 2020-09-10 ENCOUNTER — OFFICE VISIT (OUTPATIENT)
Dept: NON INVASIVE DIAGNOSTICS | Age: 40
End: 2020-09-10
Payer: COMMERCIAL

## 2020-09-10 VITALS
WEIGHT: 133 LBS | HEIGHT: 62 IN | DIASTOLIC BLOOD PRESSURE: 74 MMHG | TEMPERATURE: 98.2 F | RESPIRATION RATE: 16 BRPM | HEART RATE: 66 BPM | SYSTOLIC BLOOD PRESSURE: 122 MMHG | BODY MASS INDEX: 24.48 KG/M2

## 2020-09-10 PROCEDURE — 93000 ELECTROCARDIOGRAM COMPLETE: CPT | Performed by: INTERNAL MEDICINE

## 2020-09-10 PROCEDURE — 99214 OFFICE O/P EST MOD 30 MIN: CPT | Performed by: INTERNAL MEDICINE

## 2020-09-10 RX ORDER — DILTIAZEM HYDROCHLORIDE 120 MG/1
120 CAPSULE, COATED, EXTENDED RELEASE ORAL DAILY
Qty: 30 CAPSULE | Refills: 3 | Status: SHIPPED
Start: 2020-09-10 | End: 2021-03-29

## 2020-09-10 SDOH — HEALTH STABILITY: MENTAL HEALTH: HOW OFTEN DO YOU HAVE A DRINK CONTAINING ALCOHOL?: NEVER

## 2020-09-10 ASSESSMENT — ENCOUNTER SYMPTOMS
WHEEZING: 0
ABDOMINAL PAIN: 0
COUGH: 0
NAUSEA: 0
ABDOMINAL DISTENTION: 0
SHORTNESS OF BREATH: 0
COLOR CHANGE: 0
DIARRHEA: 0
CHEST TIGHTNESS: 0
EYE REDNESS: 0
SINUS PRESSURE: 0

## 2020-09-10 NOTE — TELEPHONE ENCOUNTER
----- Message from Valerie Bell MD sent at 9/10/2020 10:48 AM EDT -----  I would like to order a cardiac MRI - could you check the cost and notify the patient of the cost. If affordable they wish to go to [facility]. Thanks.

## 2020-09-10 NOTE — TELEPHONE ENCOUNTER
I spoke to Mr. And Mrs. Navas and gave them the number for the financial representives  for [facility] [phone]. Mr. Carrie Sanders will call them and try to get financial aid for the cardiac MRI that needs to be done in the future.

## 2020-09-10 NOTE — PROGRESS NOTES
Cardiac Electrophysiology Outpatient Progress Note    Jan Navas  1980  Date of Service: 9/10/2020  Referring Provider/PCP: No primary care provider on file. Chief Complaint: NSVT    HISTORY OF PRESENT ILLNESS    Alex Rogers presents to the office today for follow up of these Electrophysiology conditions: NSVT    7/1/20: This is a very pleasant 43-year-old female who presented to the hospital on 6/30/2020 with complaints of palpitations. Mattie Singleton is from the [facility] and moved to the [facility] about a year ago.       She initially went to the urgent care with palpitations and was found to have some nonsustained VT and was brought to the emergency room. Mattie Singleton states she also had some dizziness and some vague chest discomfort as well.       Initially, she had salvos of nonsustained VT that appeared to have a left bundle branch morphology, inferior axis, baseline EKG showed sinus rhythm QTc 425 ms.  She received 150 mg amiodarone bolus and then was placed on lidocaine drip and continued to have some nonsustained VT.  Repeat EKG showed sinus rhythm with frequent salvos of PVCs  ms.     Denies any history of sudden cardiac death in the family, she did have a syncopal episode about a year ago while in the hot sun after not eating all day.  This lasted for a few seconds when she was back to normal    7/2/20: She underwent a left heart cath with Dr. Jaqueline Hernandes that did not reveal any significant coronary coronary artery disease. She was placed on Toprol 25 mg twice daily without recurrent nonsustained VT. An echocardiogram showed a structurally normal heart. 9/10/20: Since being d/c'd from the hospital, she does report dizziness, especially when she is fatigued. She does state the she has started a exercise routine and denies any dizziness during exercise. She denies any syncope. She does fatigue after eating a large meal and she relates this to taking Toprol.     The patient denies any chest pain, dyspnea, palpitations, dizziness, syncope, orthopnea or paroxysmal nocturnal dyspnea. Patient Active Problem List    Diagnosis Date Noted    Palpitations     Abnormal EKG     Abnormal CXR     V-tach ([facility] [address] 06/30/2020       History reviewed. No pertinent family history.     SOCIAL HISTORY   Social History     Socioeconomic History    Marital status:      Spouse name: Not on file    Number of children: Not on file    Years of education: Not on file    Highest education level: Not on file   Occupational History    Not on file   Social Needs    Financial resource strain: Not on file    Food insecurity     Worry: Not on file     Inability: Not on file    Transportation needs     Medical: Not on file     Non-medical: Not on file   Tobacco Use    Smoking status: Never Smoker    Smokeless tobacco: Never Used   Substance and Sexual Activity    Alcohol use: Never     Frequency: Never    Drug use: Never    Sexual activity: Not on file   Lifestyle    Physical activity     Days per week: Not on file     Minutes per session: Not on file    Stress: Not on file   Relationships    Social connections     Talks on phone: Not on file     Gets together: Not on file     Attends Jehovah's witness service: Not on file     Active member of club or organization: Not on file     Attends meetings of clubs or organizations: Not on file     Relationship status: Not on file    Intimate partner violence     Fear of current or ex partner: Not on file     Emotionally abused: Not on file     Physically abused: Not on file     Forced sexual activity: Not on file   Other Topics Concern    Not on file   Social History Narrative    Not on file        Past Surgical History:   Procedure Laterality Date    CARDIAC CATHETERIZATION  07/01/2020    Dr. Rebecca Rogers       Current Outpatient Medications   Medication Sig Dispense Refill    dilTIAZem (CARDIZEM CD) 120 MG extended release capsule Take 1 capsule by mouth daily 30 capsule 3     No current facility-administered medications for this visit. No Known Allergies        ROS:   Review of Systems   Constitutional: Negative for fatigue and fever. HENT: Negative for congestion, nosebleeds and sinus pressure. Eyes: Negative for redness and visual disturbance. Respiratory: Negative for cough, chest tightness, shortness of breath and wheezing. Cardiovascular: Negative for chest pain, palpitations and leg swelling. Gastrointestinal: Negative for abdominal distention, abdominal pain, diarrhea and nausea. Endocrine: Negative for cold intolerance, heat intolerance, polydipsia and polyphagia. Genitourinary: Negative for difficulty urinating, frequency and urgency. Musculoskeletal: Negative for arthralgias, joint swelling and myalgias. Skin: Negative for color change and wound. Neurological: Negative for dizziness, syncope, weakness and numbness. Psychiatric/Behavioral: Negative for agitation, behavioral problems, confusion, decreased concentration, hallucinations and suicidal ideas. The patient is not nervous/anxious. PHYSICAL EXAM:  Vitals:    09/10/20 1014   BP: 122/74   Pulse: 66   Resp: 16   Temp: 98.2 °F (36.8 °C)   Weight: 133 lb (60.3 kg)   Height: 5' 2\" (1.575 m)     Physical Exam  Vitals signs reviewed. Constitutional:       Appearance: Normal appearance. HENT:      Head: Normocephalic. Mouth/Throat:      Mouth: Mucous membranes are moist.      Pharynx: Oropharynx is clear. Eyes:      Conjunctiva/sclera: Conjunctivae normal.   Neck:      Musculoskeletal: Normal range of motion and neck supple. Vascular: No carotid bruit. Cardiovascular:      Rate and Rhythm: Normal rate and regular rhythm. Pulses: Normal pulses. Heart sounds: Normal heart sounds. Pulmonary:      Effort: Pulmonary effort is normal.      Breath sounds: Normal breath sounds. No rales. Chest:      Chest wall: No tenderness.    Abdominal:      General: Bowel sounds are normal.      Palpations: Abdomen is soft. Musculoskeletal: Normal range of motion. Skin:     General: Skin is warm. Neurological:      General: No focal deficit present. Mental Status: She is alert and oriented to person, place, and time. Psychiatric:         Mood and Affect: Mood normal.         Behavior: Behavior normal.         Thought Content:  Thought content normal.            Pertinent Labs:    CBC:   WBC (E9/L)   Date Value   07/02/2020 12.2 (H)   07/01/2020 12.3 (H)   06/30/2020 11.7 (H)     Hemoglobin (g/dL)   Date Value   07/02/2020 14.7   07/01/2020 14.1   06/30/2020 15.0     Hematocrit (%)   Date Value   07/02/2020 45.2   07/01/2020 44.3   06/30/2020 45.5     Platelets (X3/L)   Date Value   07/02/2020 356   07/01/2020 353   06/30/2020 414      BMP:   Sodium (mmol/L)   Date Value   07/02/2020 136   07/01/2020 140   06/30/2020 142     Potassium (mmol/L)   Date Value   07/02/2020 3.6   07/01/2020 3.7   06/30/2020 3.5     Magnesium (mg/dL)   Date Value   07/02/2020 2.1   07/01/2020 4.0 (H)   06/30/2020 3.3 (H)     Chloride (mmol/L)   Date Value   07/02/2020 104   07/01/2020 115 (H)   06/30/2020 110 (H)     CO2 (mmol/L)   Date Value   07/02/2020 22   07/01/2020 19 (L)   06/30/2020 20 (L)     BUN (mg/dL)   Date Value   07/02/2020 9   07/01/2020 7   06/30/2020 7     CREATININE (mg/dL)   Date Value   07/02/2020 0.6   07/01/2020 0.6   06/30/2020 0.6     Glucose (mg/dL)   Date Value   07/02/2020 91   07/01/2020 86   06/30/2020 95     Calcium (mg/dL)   Date Value   07/02/2020 8.4 (L)   07/01/2020 6.4 (L)   06/30/2020 8.0 (L)        TSH:   TSH (uIU/mL)   Date Value   07/01/2020 2.550      Cardiac Injury Profile:   Troponin (ng/mL)   Date Value   07/01/2020 <0.01     Lipid Profile:   Triglycerides (mg/dL)   Date Value   07/01/2020 56     HDL (mg/dL)   Date Value   07/01/2020 42     LDL Calculated (mg/dL)   Date Value   07/01/2020 60     Cholesterol, Total (mg/dL)   Date Value   07/01/2020 113 Hemoglobin A1C:   Hemoglobin A1C (%)   Date Value   2020 4.7       Pertinent Cardiac Testin/30/20 TTE    Summary   Normal left ventricular size, wall thickness, wall motion, and systolic   function. Ejection fraction is visually estimated at 65+/-5%. Normal diastolic function. Normal right ventricular size and function. Normal size atria. No valvular abnormalities noted. Cardiac Cath 20  CONCLUSIONS:  1. Patent coronary arteries without any significant angiographic  coronary artery disease. 2.  Normal left ventricular size with no regional wall motion  abnormality noted on the 35-degree RUBIN view with an estimated ejection  fraction of 55%. ECG 9/10/2020: normal EKG, normal sinus rhythm, unchanged from previous tracings    EKG 2020: Normal sinus rhythm, early repolarization,  ms    I have independently reviewed all of the ECGs and rhythm strips per above    I have personally reviewed the laboratory, cardiac diagnostic and radiographic testing as outlined above: We have requested previous records. 1. NSVT (nonsustained ventricular tachycardia) ([address]    2. PVC (premature ventricular contraction)    3. Palpitations         Assessment & Plan  1. NSVT  - Most likely RVOT nonsustained VT as evidenced by EKG on presentation.   -Her heart is structurally normal on echocardiogram, right ventricle appears normal.  Heart catheterization does not show any evidence of coronary disease  -Continue metoprolol therapy at 37.5 mg twice daily, will changed to Cardizem 120mg daily.   -We will arrange for outpatient cardiac MRI    Plan:   1. Will change Toprol to Cardizem 120mg daily to help with fatigue. 2. Obtain a 24 hr Holter in 3 weeks after being on Cardizem. 3. Will try and obtain a cardiac MRI, patient wishes to know cost.   4. Follow up in 6 months or sooner PRN. Thank you for allowing me to participate in your patient's care.     Alis Valdivia MD  Cardiac Electrophysiology  [address] [place] [address]

## 2020-10-12 ENCOUNTER — TELEPHONE (OUTPATIENT)
Dept: NON INVASIVE DIAGNOSTICS | Age: 40
End: 2020-10-12

## 2020-10-12 ENCOUNTER — NURSE ONLY (OUTPATIENT)
Dept: NON INVASIVE DIAGNOSTICS | Age: 40
End: 2020-10-12

## 2020-10-12 VITALS — TEMPERATURE: 97.7 F

## 2020-10-12 RX ORDER — ACEBUTOLOL HYDROCHLORIDE 200 MG/1
200 CAPSULE ORAL DAILY
COMMUNITY
End: 2020-10-12 | Stop reason: SDUPTHER

## 2020-10-12 RX ORDER — ACEBUTOLOL HYDROCHLORIDE 200 MG/1
200 CAPSULE ORAL DAILY
Qty: 30 CAPSULE | Refills: 3 | Status: SHIPPED
Start: 2020-10-12 | End: 2021-02-08 | Stop reason: SDUPTHER

## 2020-10-12 NOTE — PROGRESS NOTES
Patient was in today for 24 hr holter monitor per Dr Mariela Washington. Patient given instructions and questions answered, verbalized understanding.       Bennett Bojorquez

## 2020-10-12 NOTE — TELEPHONE ENCOUNTER
Spoke with patients  Meredith Sousa and he verbalized understanding and requested medication to be called into [address]. Patient will start tonight around 6-7 depending if they get medication in time, if not she will start tomorrow.

## 2020-10-12 NOTE — TELEPHONE ENCOUNTER
Patient was in today for 24 hr holter monitor and wanted to let you know the Diltiazem is making her more tired and nervous feeling. Wanted to know if it could be switched to something else.

## 2021-02-08 RX ORDER — ACEBUTOLOL HYDROCHLORIDE 200 MG/1
200 CAPSULE ORAL DAILY
Qty: 90 CAPSULE | Refills: 3 | Status: SHIPPED
Start: 2021-02-08 | End: 2022-02-02 | Stop reason: SDUPTHER

## 2021-03-05 ENCOUNTER — OFFICE VISIT (OUTPATIENT)
Dept: FAMILY MEDICINE CLINIC | Age: 41
End: 2021-03-05
Payer: COMMERCIAL

## 2021-03-05 VITALS
TEMPERATURE: 97.8 F | WEIGHT: 143 LBS | BODY MASS INDEX: 26.31 KG/M2 | HEIGHT: 62 IN | RESPIRATION RATE: 18 BRPM | HEART RATE: 77 BPM | SYSTOLIC BLOOD PRESSURE: 110 MMHG | OXYGEN SATURATION: 99 % | DIASTOLIC BLOOD PRESSURE: 70 MMHG

## 2021-03-05 DIAGNOSIS — U07.1 COVID-19: Primary | ICD-10-CM

## 2021-03-05 DIAGNOSIS — R05.9 COUGH: ICD-10-CM

## 2021-03-05 LAB
INFLUENZA A ANTIBODY: NORMAL
INFLUENZA B ANTIBODY: NORMAL
Lab: ABNORMAL
PERFORMING INSTRUMENT: ABNORMAL
QC PASS/FAIL: ABNORMAL
SARS-COV-2, POC: DETECTED

## 2021-03-05 PROCEDURE — 87426 SARSCOV CORONAVIRUS AG IA: CPT | Performed by: PHYSICIAN ASSISTANT

## 2021-03-05 PROCEDURE — 99213 OFFICE O/P EST LOW 20 MIN: CPT | Performed by: PHYSICIAN ASSISTANT

## 2021-03-05 PROCEDURE — 87804 INFLUENZA ASSAY W/OPTIC: CPT | Performed by: PHYSICIAN ASSISTANT

## 2021-03-05 RX ORDER — BENZONATATE 100 MG/1
100 CAPSULE ORAL 3 TIMES DAILY PRN
Qty: 21 CAPSULE | Refills: 0 | Status: SHIPPED | OUTPATIENT
Start: 2021-03-05 | End: 2021-03-12

## 2021-03-05 RX ORDER — CHLORPHENIRAMINE MALEATE 4 MG/1
4 TABLET ORAL EVERY 6 HOURS PRN
Qty: 20 TABLET | Refills: 0 | Status: SHIPPED | OUTPATIENT
Start: 2021-03-05

## 2021-03-05 NOTE — PROGRESS NOTES
3/5/21  Jan Navas : 1980 Sex: female  Age 38 y.o. Subjective:  Chief Complaint   Patient presents with    Headache     x3 days with headache, coughing and clear mucous    Cough         HPI:   Jan Navas , 38 y.o. female presents to express care for evaluation of headache, congestion, cough, drainage. The patient has had the symptoms ongoing for the last 2 to 3 days. The patient is here with  who just recently tested positive for COVID-19. They were recently exposed to a family that the daughter had babysat for that tested positive for COVID-19. The patient is not having any loss of smell or taste. No fevers. The patient's not any GI symptoms. The patient states that she just feels generalized achiness. ROS:   Unless otherwise stated in this report the patient's positive and negative responses for review of systems for constitutional, eyes, ENT, cardiovascular, respiratory, gastrointestinal, neurological, , musculoskeletal, and integument systems and related systems to the presenting problem are either stated in the history of present illness or were not pertinent or were negative for the symptoms and/or complaints related to the presenting medical problem. Positives and pertinent negatives as per HPI. All others reviewed and are negative. PMH:     Past Medical History:   Diagnosis Date    V-tach [facility])    atrial fibrillation    Past Surgical History:   Procedure Laterality Date    CARDIAC CATHETERIZATION  2020    Dr. Dyana Good       No family history on file.     Medications:     Current Outpatient Medications:     benzonatate (TESSALON) 100 MG capsule, Take 1 capsule by mouth 3 times daily as needed for Cough, Disp: 21 capsule, Rfl: 0    chlorpheniramine (ALLER-CHLOR) 4 MG tablet, Take 1 tablet by mouth every 6 hours as needed for Allergies, Disp: 20 tablet, Rfl: 0    acebutolol (SECTRAL) 200 MG capsule, Take 1 capsule by mouth daily, Disp: 90 capsule, Rfl: 3

## 2021-03-29 ENCOUNTER — OFFICE VISIT (OUTPATIENT)
Dept: NON INVASIVE DIAGNOSTICS | Age: 41
End: 2021-03-29

## 2021-03-29 VITALS
DIASTOLIC BLOOD PRESSURE: 68 MMHG | HEART RATE: 73 BPM | RESPIRATION RATE: 16 BRPM | HEIGHT: 62 IN | WEIGHT: 140.6 LBS | BODY MASS INDEX: 25.88 KG/M2 | SYSTOLIC BLOOD PRESSURE: 122 MMHG

## 2021-03-29 DIAGNOSIS — R00.2 PALPITATIONS: ICD-10-CM

## 2021-03-29 DIAGNOSIS — I47.29 NSVT (NONSUSTAINED VENTRICULAR TACHYCARDIA): Primary | ICD-10-CM

## 2021-03-29 DIAGNOSIS — I47.29 RVOT VENTRICULAR TACHYCARDIA: ICD-10-CM

## 2021-03-29 DIAGNOSIS — R94.31 ABNORMAL EKG: ICD-10-CM

## 2021-03-29 PROCEDURE — 99214 OFFICE O/P EST MOD 30 MIN: CPT | Performed by: INTERNAL MEDICINE

## 2021-03-29 PROCEDURE — 93000 ELECTROCARDIOGRAM COMPLETE: CPT | Performed by: INTERNAL MEDICINE

## 2021-03-29 ASSESSMENT — ENCOUNTER SYMPTOMS
SINUS PRESSURE: 0
NAUSEA: 0
CHEST TIGHTNESS: 0
COUGH: 0
ABDOMINAL DISTENTION: 0
EYE REDNESS: 0
ABDOMINAL PAIN: 0
DIARRHEA: 0
COLOR CHANGE: 0
SHORTNESS OF BREATH: 0
WHEEZING: 0

## 2021-03-29 NOTE — PROGRESS NOTES
Cardiac Electrophysiology Outpatient Progress Note    Jan Navas  1980  Date of Service: 3/29/2021  Referring Provider/PCP: No primary care provider on file. Chief Complaint: NSVT    HISTORY OF PRESENT ILLNESS    Vlad Mortensen presents to the office today for follow up of these Electrophysiology conditions: NSVT    7/1/20: This is a very pleasant 65-year-old female who presented to the hospital on 6/30/2020 with complaints of palpitations. Bakari Richter is from the [facility] and moved to the [facility] about a year ago.       She initially went to the urgent care with palpitations and was found to have some nonsustained VT and was brought to the emergency room. Bakari Richter states she also had some dizziness and some vague chest discomfort as well.       Initially, she had salvos of nonsustained VT that appeared to have a left bundle branch morphology, inferior axis, baseline EKG showed sinus rhythm QTc 425 ms.  She received 150 mg amiodarone bolus and then was placed on lidocaine drip and continued to have some nonsustained VT.  Repeat EKG showed sinus rhythm with frequent salvos of PVCs  ms.     Denies any history of sudden cardiac death in the family, she did have a syncopal episode about a year ago while in the hot sun after not eating all day.  This lasted for a few seconds when she was back to normal    7/2/20: She underwent a left heart cath with Dr. Tangela Block that did not reveal any significant coronary coronary artery disease. She was placed on Toprol 25 mg twice daily without recurrent nonsustained VT. An echocardiogram showed a structurally normal heart. 9/10/20: Since being d/c'd from the hospital, she does report dizziness, especially when she is fatigued. She does state the she has started a exercise routine and denies any dizziness during exercise. She denies any syncope. She does fatigue after eating a large meal and she relates this to taking Toprol.     3/29/21: Since her last office current or ex partner: Not on file     Emotionally abused: Not on file     Physically abused: Not on file     Forced sexual activity: Not on file   Other Topics Concern    Not on file   Social History Narrative    Not on file        Past Surgical History:   Procedure Laterality Date    CARDIAC CATHETERIZATION  07/01/2020    Dr. Tamiko Smith       Current Outpatient Medications   Medication Sig Dispense Refill    chlorpheniramine (ALLER-CHLOR) 4 MG tablet Take 1 tablet by mouth every 6 hours as needed for Allergies 20 tablet 0    acebutolol (SECTRAL) 200 MG capsule Take 1 capsule by mouth daily 90 capsule 3     No current facility-administered medications for this visit. No Known Allergies        ROS:   Review of Systems   Constitutional: Negative for fatigue and fever. HENT: Negative for congestion, nosebleeds and sinus pressure. Eyes: Negative for redness and visual disturbance. Respiratory: Negative for cough, chest tightness, shortness of breath and wheezing. Cardiovascular: Negative for chest pain, palpitations and leg swelling. Gastrointestinal: Negative for abdominal distention, abdominal pain, diarrhea and nausea. Endocrine: Negative for cold intolerance, heat intolerance, polydipsia and polyphagia. Genitourinary: Negative for difficulty urinating, frequency and urgency. Musculoskeletal: Negative for arthralgias, joint swelling and myalgias. Skin: Negative for color change and wound. Neurological: Negative for dizziness, syncope, weakness and numbness. Psychiatric/Behavioral: Negative for agitation, behavioral problems, confusion, decreased concentration, hallucinations and suicidal ideas. The patient is not nervous/anxious. PHYSICAL EXAM:  Vitals:    03/29/21 1037   BP: 122/68   Pulse: 73   Resp: 16   Weight: 140 lb 9.6 oz (63.8 kg)   Height: 5' 2\" (1.575 m)     Physical Exam  Vitals signs reviewed. Constitutional:       Appearance: Normal appearance.    HENT: Head: Normocephalic. Mouth/Throat:      Mouth: Mucous membranes are moist.      Pharynx: Oropharynx is clear. Eyes:      Conjunctiva/sclera: Conjunctivae normal.   Neck:      Musculoskeletal: Normal range of motion and neck supple. Vascular: No carotid bruit. Cardiovascular:      Rate and Rhythm: Normal rate and regular rhythm. Pulses: Normal pulses. Heart sounds: Normal heart sounds. Pulmonary:      Effort: Pulmonary effort is normal.      Breath sounds: Normal breath sounds. No rales. Chest:      Chest wall: No tenderness. Abdominal:      General: Bowel sounds are normal.      Palpations: Abdomen is soft. Musculoskeletal: Normal range of motion. Skin:     General: Skin is warm. Neurological:      General: No focal deficit present. Mental Status: She is alert and oriented to person, place, and time. Psychiatric:         Mood and Affect: Mood normal.         Behavior: Behavior normal.         Thought Content:  Thought content normal.            Pertinent Labs:    CBC:   WBC (E9/L)   Date Value   07/02/2020 12.2 (H)   07/01/2020 12.3 (H)   06/30/2020 11.7 (H)     Hemoglobin (g/dL)   Date Value   07/02/2020 14.7   07/01/2020 14.1   06/30/2020 15.0     Hematocrit (%)   Date Value   07/02/2020 45.2   07/01/2020 44.3   06/30/2020 45.5     Platelets (H9/W)   Date Value   07/02/2020 356   07/01/2020 353   06/30/2020 414      BMP:   Sodium (mmol/L)   Date Value   07/02/2020 136   07/01/2020 140   06/30/2020 142     Potassium (mmol/L)   Date Value   07/02/2020 3.6   07/01/2020 3.7   06/30/2020 3.5     Magnesium (mg/dL)   Date Value   07/02/2020 2.1   07/01/2020 4.0 (H)   06/30/2020 3.3 (H)     Chloride (mmol/L)   Date Value   07/02/2020 104   07/01/2020 115 (H)   06/30/2020 110 (H)     CO2 (mmol/L)   Date Value   07/02/2020 22   07/01/2020 19 (L)   06/30/2020 20 (L)     BUN (mg/dL)   Date Value   07/02/2020 9   07/01/2020 7   06/30/2020 7     CREATININE (mg/dL)   Date Value 2020 0.6   2020 0.6   2020 0.6     Glucose (mg/dL)   Date Value   2020 91   2020 86   2020 95     Calcium (mg/dL)   Date Value   2020 8.4 (L)   2020 6.4 (L)   2020 8.0 (L)        TSH:   TSH (uIU/mL)   Date Value   2020 2.550      Cardiac Injury Profile:   Troponin (ng/mL)   Date Value   2020 <0.01     Lipid Profile:   Triglycerides (mg/dL)   Date Value   2020 56     HDL (mg/dL)   Date Value   2020 42     LDL Calculated (mg/dL)   Date Value   2020 60     Cholesterol, Total (mg/dL)   Date Value   2020 113      Hemoglobin A1C:   Hemoglobin A1C (%)   Date Value   2020 4.7       Pertinent Cardiac Testin/30/20 TTE    Summary   Normal left ventricular size, wall thickness, wall motion, and systolic   function. Ejection fraction is visually estimated at 65+/-5%. Normal diastolic function. Normal right ventricular size and function. Normal size atria. No valvular abnormalities noted. Cardiac Cath 20  CONCLUSIONS:  1. Patent coronary arteries without any significant angiographic  coronary artery disease. 2.  Normal left ventricular size with no regional wall motion  abnormality noted on the 35-degree RUBIN view with an estimated ejection  fraction of 55%. 24 hour HM: 10/2020  Monitor shows underlying  Sinus Rhythm   Lowest HR 53 Avg 73 Fastest 117 bpm   PVC  (2342) 2.3%   SVE burden 4   No significant arrhythmia noted       ECG 3/29/2021: normal EKG, normal sinus rhythm, unchanged from previous tracings     EKG 2020: Normal sinus rhythm, early repolarization,  ms    I have independently reviewed all of the ECGs and rhythm strips per above    I have personally reviewed the laboratory, cardiac diagnostic and radiographic testing as outlined above: We have requested previous records. 1. NSVT (nonsustained ventricular tachycardia) ([address]    2. RVOT ventricular tachycardia ([address]    3. Palpitations    4. Abnormal EKG         Assessment & Plan  1. NSVT  - Most likely RVOT nonsustained VT as evidenced by EKG on presentation.   -Her heart is structurally normal on echocardiogram, right ventricle appears normal.  Heart catheterization does not show any evidence of coronary disease  -Was on metoprolol therapy at 37.5 mg twice daily, changed to Cardizem 120mg daily, which was changed to Acebutolol  - Pt defers a cardiac MRI due to cost    Plan:   1. Continue acebutolol 200mg daily. Would consider increasing if her symptoms persist versus a EP study +/- ablation vs adding AAD. At this point, she wishes to continue current therapy  2. Follow up in 6 months or sooner PRN. Thank you for allowing me to participate in your patient's care.     Debbi Melo MD  Cardiac Electrophysiology  [address] [place] [address]

## 2022-02-02 RX ORDER — ACEBUTOLOL HYDROCHLORIDE 200 MG/1
200 CAPSULE ORAL DAILY
Qty: 90 CAPSULE | Refills: 3 | Status: SHIPPED | OUTPATIENT
Start: 2022-02-02

## 2022-02-03 ENCOUNTER — VIRTUAL VISIT (OUTPATIENT)
Dept: NON INVASIVE DIAGNOSTICS | Age: 42
End: 2022-02-03

## 2022-02-03 ENCOUNTER — TELEPHONE (OUTPATIENT)
Dept: NON INVASIVE DIAGNOSTICS | Age: 42
End: 2022-02-03

## 2022-02-03 DIAGNOSIS — I47.29 RVOT VENTRICULAR TACHYCARDIA: Primary | ICD-10-CM

## 2022-02-03 DIAGNOSIS — R00.2 PALPITATIONS: ICD-10-CM

## 2022-02-03 DIAGNOSIS — R94.31 ABNORMAL EKG: ICD-10-CM

## 2022-02-03 DIAGNOSIS — I47.29 NSVT (NONSUSTAINED VENTRICULAR TACHYCARDIA): ICD-10-CM

## 2022-02-03 PROCEDURE — 99214 OFFICE O/P EST MOD 30 MIN: CPT | Performed by: INTERNAL MEDICINE

## 2022-02-03 NOTE — TELEPHONE ENCOUNTER
----- Message from Coni Cruz MD sent at 2/3/2022 11:57 AM EST -----  She will need ECG before and after flecainide 50 mg bid initiation.

## 2022-02-03 NOTE — PROGRESS NOTES
2/3/2022    TELEHEALTH EVALUATION -- Audio/Visual (During NPDNK-58 public health emergency)    HPI:    José Easton (:  1980) has requested an audio/video evaluation for the following concern(s):     NSVT     20:   This is a very pleasant 14-year-old female who presented to the hospital on 2020 with complaints of palpitations. Dinah Melchor is from the [facility] and moved to the [facility] about a year ago.       She initially went to the urgent care with palpitations and was found to have some nonsustained VT and was brought to the emergency room. Dinah Melchor states she also had some dizziness and some vague chest discomfort as well.       Initially, she had salvos of nonsustained VT that appeared to have a left bundle branch morphology, inferior axis, baseline EKG showed sinus rhythm QTc 425 ms.  She received 150 mg amiodarone bolus and then was placed on lidocaine drip and continued to have some nonsustained VT.  Repeat EKG showed sinus rhythm with frequent salvos of PVCs  ms.      Denies any history of sudden cardiac death in the family, she did have a syncopal episode about a year ago while in the hot sun after not eating all day.  This lasted for a few seconds when she was back to normal     20: She underwent a left heart cath with Dr. Lida Abad that did not reveal any significant coronary coronary artery disease.  She was placed on Toprol 25 mg twice daily without recurrent nonsustained VT.  An echocardiogram showed a structurally normal heart.     9/10/20: Since being d/c'd from the hospital, she does report dizziness, especially when she is fatigued. She does state the she has started a exercise routine and denies any dizziness during exercise. She denies any syncope.      She does fatigue after eating a large meal and she relates this to taking Toprol.     3/29/21: Since her last office visit Mr. Amadou Dubon wore a 24 hour HM that showed a PVC burden of 2.3%.  She reports having episodes of palpitations which can also occur after she eats a meal, to alleviate symptoms she will \"burp\". Also after eating she reports having chest discomfort but the \"burping\" makes her feel better. She was on Cardizem but now is on acebutolol for suppression. When she does have palpitations, her episodes can last up to 30 minutes intermittently and this occurs about twice a month     2/3/22 : She gets palpitations on/off. It occurs about 3 X a week. No syncope or chest pain. .    Review of Systems     General: No unusual weight gain, change in exercise tolerance  Skin: No rash or itching  EENT: No vision changes or nosebleeds  Cardiovascular: No orthopnea or paroxysmal nocturnal dyspnea, neg chest pain, palpitation  Respiratory: neg Cough  and sob  Gastrointestinal: No hematemesis or recent changes in bowel habits  Genitourinary: No hematuria, urgency or frequency  Musculoskeletal: No muscular weakness or joint swelling   Neurologic / Psychiatric: No incoordination or convulsions  Allergic / Immunologic/ Lymphatic / Endocrine: No anemia or bleeding tendency      Prior to Visit Medications    Medication Sig Taking?  Authorizing Provider   acebutolol (SECTRAL) 200 MG capsule Take 1 capsule by mouth daily Yes FRITZ Brady CNP   chlorpheniramine (ALLER-CHLOR) 4 MG tablet Take 1 tablet by mouth every 6 hours as needed for Allergies  Patient not taking: Reported on 2/3/2022  WILLEM Costa III       Social History     Tobacco Use    Smoking status: Never Smoker    Smokeless tobacco: Never Used   Vaping Use    Vaping Use: Never used   Substance Use Topics    Alcohol use: Yes     Comment: sometimes drinks wine    Drug use: Never        PHYSICAL EXAMINATION:   No physical exam due to virtual visit    6/30/20 TTE    Summary   Normal left ventricular size, wall thickness, wall motion, and systolic   function.   Ejection fraction is visually estimated at 65+/-5%.   Normal diastolic function.   Normal right ventricular size and function.   Normal size atria.   No valvular abnormalities noted.     Cardiac Cath 7/1/20  CONCLUSIONS:  1.  Patent coronary arteries without any significant angiographic  coronary artery disease. 2.  Normal left ventricular size with no regional wall motion  abnormality noted on the 35-degree RUBIN view with an estimated ejection  fraction of 55%.          ECG 9/10/2020: normal EKG, normal sinus rhythm, unchanged from previous tracings     EKG 7/2/2020: Normal sinus rhythm, early repolarization,  ms    10/16/20 24 hr Holter  Monitor shows underlying  Sinus Rhythm   Lowest HR 53 Avg 73 Fastest 117 bpm   PVC  (2342) 2.3%   SVE burden 4   No significant arrhythmia noted       ASSESSMENT/PLAN:    1. RVOT ventricular tachycardia ([address]    2. NSVT (nonsustained ventricular tachycardia) (HCC)    3. Palpitations    4. Abnormal EKG      1. NSVT  - Most likely RVOT nonsustained VT as evidenced by [facility] presentation.   -Her heart is structurally normal on echocardiogram, right ventricle appears normal.  Heart catheterization does not show any evidence of coronary disease  -Was on metoprolol therapy at 37.5 mg twice daily (caused fatigue), changed to Cardizem 120mg daily (caused fatigue), which was changed to Acebutolol  - Pt defers a cardiac MRI due to cost  - Still with palpitations intermittently  - Discussed EPS but she would like to try medical therapy   - Will add Flecainide 50 mg bid and up titrtae. ECG before and after    Return for 6 month OV No device no AAD. Stiven Yoo is a 42 y.o. female being evaluated by a Virtual Visit/phone/(video visit) encounter to address concerns as mentioned above. A caregiver was present when appropriate. Due to this being a TeleHealth encounter (During SKTBX-32 public health emergency), evaluation of the following organ systems was limited: Vitals/Constitutional/EENT/Resp/CV/GI//MS/Neuro/Skin/Heme-Lymph-Imm.   Pursuant to the emergency declaration under the [address] [place] [address] authority and the [employer] and [employer] Act, this Virtual Visit was conducted with patient's (and/or legal guardian's) consent, to reduce the patient's risk of exposure to COVID-19 and provide necessary medical care. The patient (and/or legal guardian) has also been advised to contact this office for worsening conditions or problems, and seek emergency medical treatment and/or call 911 if deemed necessary. Services were provided through a phone/video synchronous discussion virtually to substitute for in-person clinic visit. Patient and provider were located at their individual homes. --Patricia Jordan MD on 2/3/2022 at 1:05 PM    An electronic signature was used to authenticate this note.

## 2022-02-07 ENCOUNTER — TELEPHONE (OUTPATIENT)
Dept: NON INVASIVE DIAGNOSTICS | Age: 42
End: 2022-02-07

## 2022-02-07 ENCOUNTER — NURSE ONLY (OUTPATIENT)
Dept: NON INVASIVE DIAGNOSTICS | Age: 42
End: 2022-02-07

## 2022-02-07 DIAGNOSIS — I47.29 NSVT (NONSUSTAINED VENTRICULAR TACHYCARDIA): Primary | ICD-10-CM

## 2022-02-07 PROCEDURE — 93000 ELECTROCARDIOGRAM COMPLETE: CPT | Performed by: INTERNAL MEDICINE

## 2022-02-07 RX ORDER — FLECAINIDE ACETATE 50 MG/1
50 TABLET ORAL 2 TIMES DAILY
Qty: 180 TABLET | Refills: 3 | Status: SHIPPED
Start: 2022-02-07 | End: 2022-02-16 | Stop reason: DRUGHIGH

## 2022-02-07 RX ORDER — FLECAINIDE ACETATE 50 MG/1
50 TABLET ORAL 2 TIMES DAILY
COMMUNITY
End: 2022-02-07 | Stop reason: SDUPTHER

## 2022-02-07 NOTE — TELEPHONE ENCOUNTER
----- Message from Sylvester Dominique MD sent at 2/7/2022 10:54 AM EST -----  Start flecainide 50mg bid.  ECG in one week  thx

## 2022-02-07 NOTE — PROGRESS NOTES
Patient was in today for EKG per Dr Maninder Obregon. Patient complains of palpitations and occasional dizziness. Ekg before starting flecainide.     Maryjo Noe

## 2022-02-07 NOTE — TELEPHONE ENCOUNTER
Spoke with patient's  about starting medication and repeat EKG in one week. Appointment scheduled and patient verbalized understanding.

## 2022-02-15 ENCOUNTER — NURSE ONLY (OUTPATIENT)
Dept: NON INVASIVE DIAGNOSTICS | Age: 42
End: 2022-02-15

## 2022-02-15 DIAGNOSIS — Z79.899 ENCOUNTER FOR MONITORING FLECAINIDE THERAPY: ICD-10-CM

## 2022-02-15 DIAGNOSIS — I47.29 NSVT (NONSUSTAINED VENTRICULAR TACHYCARDIA): Primary | ICD-10-CM

## 2022-02-15 DIAGNOSIS — Z51.81 ENCOUNTER FOR MONITORING FLECAINIDE THERAPY: ICD-10-CM

## 2022-02-15 PROCEDURE — 93000 ELECTROCARDIOGRAM COMPLETE: CPT | Performed by: INTERNAL MEDICINE

## 2022-02-15 NOTE — PROGRESS NOTES
Patient was seen in the Office today to have EKG per Dr. Daron Avila. Pt tolerated EKG. Pt wants to know if she can drink wine and coffee while on Flecainide. Message sent to Dr. Daron Avila for advise.      Electronically signed by Bethany Pitt MA on 2/15/2022 at 10:39 AM

## 2022-02-16 ENCOUNTER — TELEPHONE (OUTPATIENT)
Dept: NON INVASIVE DIAGNOSTICS | Age: 42
End: 2022-02-16

## 2022-02-16 RX ORDER — FLECAINIDE ACETATE 100 MG/1
100 TABLET ORAL 2 TIMES DAILY
COMMUNITY
End: 2022-02-16 | Stop reason: SDUPTHER

## 2022-02-16 RX ORDER — FLECAINIDE ACETATE 100 MG/1
100 TABLET ORAL 2 TIMES DAILY
Qty: 180 TABLET | Refills: 1 | Status: SHIPPED | OUTPATIENT
Start: 2022-02-16

## 2022-02-16 NOTE — TELEPHONE ENCOUNTER
Pt returned call and was given recommendations from Dr. Chris Baker. Pt understood recommendations.     Electronically signed by Vern Miranda MA on 2/16/2022 at 11:50 AM

## 2022-02-16 NOTE — TELEPHONE ENCOUNTER
----- Message from Mercedes Jc MD sent at 2/16/2022 10:40 AM EST -----  Coffe and wine very sparingly due to medication use and hx of PVCs  ----- Message -----  From: Vern Miranda MA  Sent: 2/15/2022  10:40 AM EST  To: Jami Cruz MD    Pt seen in office today for EKG, on flecainide and wants to know if she can drink wine and coffee. Pt does not have any current symptoms at this time.

## 2022-02-16 NOTE — TELEPHONE ENCOUNTER
Spoke with patient and  let them know recommendations of [facility] to increase medication. They verbalized understanding and script sent to pharmacy.

## 2022-02-16 NOTE — TELEPHONE ENCOUNTER
----- Message from Caitlyn Goldsmith MD sent at 2/16/2022 10:34 AM EST -----  Increase Flecainide to 100 mg bid. How did she tolerate the initial dose?

## 2022-11-28 RX ORDER — FLECAINIDE ACETATE 50 MG/1
TABLET ORAL
Qty: 180 TABLET | Refills: 3 | OUTPATIENT
Start: 2022-11-28

## 2022-12-08 DIAGNOSIS — I47.29 NSVT (NONSUSTAINED VENTRICULAR TACHYCARDIA): Primary | ICD-10-CM

## 2022-12-08 RX ORDER — FLECAINIDE ACETATE 100 MG/1
100 TABLET ORAL 2 TIMES DAILY
Qty: 60 TABLET | Refills: 0 | Status: SHIPPED | OUTPATIENT
Start: 2022-12-08

## 2022-12-27 ENCOUNTER — HOSPITAL ENCOUNTER (OUTPATIENT)
Age: 42
Discharge: HOME | End: 2022-12-27
Payer: COMMERCIAL

## 2022-12-27 DIAGNOSIS — I47.29 NSVT (NONSUSTAINED VENTRICULAR TACHYCARDIA): ICD-10-CM

## 2022-12-27 LAB
ALBUMIN SERPL-MCNC: 4 G/DL (ref 3.5–5.2)
ALP BLD-CCNC: 54 U/L (ref 35–104)
ALT SERPL-CCNC: 11 U/L (ref 0–32)
ANION GAP SERPL CALCULATED.3IONS-SCNC: 10 MMOL/L (ref 7–16)
AST SERPL-CCNC: 19 U/L (ref 0–31)
BILIRUB SERPL-MCNC: 0.7 MG/DL (ref 0–1.2)
BUN BLDV-MCNC: 8 MG/DL (ref 6–20)
CALCIUM SERPL-MCNC: 9.2 MG/DL (ref 8.6–10.2)
CHLORIDE BLD-SCNC: 103 MMOL/L (ref 98–107)
CO2: 25 MMOL/L (ref 22–29)
CREAT SERPL-MCNC: 0.7 MG/DL (ref 0.5–1)
GFR SERPL CREATININE-BSD FRML MDRD: >60 ML/MIN/1.73
GLUCOSE BLD-MCNC: 95 MG/DL (ref 74–99)
POTASSIUM SERPL-SCNC: 4.2 MMOL/L (ref 3.5–5)
SODIUM BLD-SCNC: 138 MMOL/L (ref 132–146)
TOTAL PROTEIN: 7.6 G/DL (ref 6.4–8.3)

## 2022-12-27 PROCEDURE — 36415 COLL VENOUS BLD VENIPUNCTURE: CPT

## 2022-12-27 PROCEDURE — 80053 COMPREHEN METABOLIC PANEL: CPT

## 2023-03-08 RX ORDER — FLECAINIDE ACETATE 100 MG/1
TABLET ORAL
Qty: 180 TABLET | Refills: 1 | Status: SHIPPED | OUTPATIENT
Start: 2023-03-08

## 2023-03-10 ENCOUNTER — TELEPHONE (OUTPATIENT)
Dept: NON INVASIVE DIAGNOSTICS | Age: 43
End: 2023-03-10

## 2023-03-10 NOTE — TELEPHONE ENCOUNTER
Spoke with patients  and made a follow up appt with Dr Delfin Nageotte per their request for provider.

## 2023-03-22 RX ORDER — ACEBUTOLOL HYDROCHLORIDE 200 MG/1
CAPSULE ORAL
Qty: 90 CAPSULE | Refills: 3 | OUTPATIENT
Start: 2023-03-22

## 2023-05-01 NOTE — PROGRESS NOTES
(EPIPEN) 0.3 MG/0.3ML SOAJ injection USE AS DIRECTED AS NEEDED      FLOVENT  MCG/ACT inhaler INHALE 2 PUFFS INTO THE LUNGS TWICE DAY      acebutolol (SECTRAL) 200 MG capsule Take 1 capsule by mouth daily 90 capsule 3    flecainide (TAMBOCOR) 50 MG tablet Take 1 tablet by mouth 2 times daily 180 tablet 3    chlorpheniramine (ALLER-CHLOR) 4 MG tablet Take 1 tablet by mouth every 6 hours as needed for Allergies 20 tablet 0     No current facility-administered medications for this visit. No Known Allergies    ROS:   Constitutional: Negative for fever, activity change and appetite change. HENT: Negative for epistaxis. Eyes: Negative for diploplia, blurred vision. Respiratory: Negative for cough, chest tightness, shortness of breath and wheezing. Cardiovascular: pertinent positives in HPI  Gastrointestinal: Negative for abdominal pain and blood in stool. All other review of systems are negative     PHYSICAL EXAM:      Vitals:    05/02/23 1513   BP: 122/78   Pulse: 99   Resp: 16   Weight: 141 lb 12.8 oz (64.3 kg)   Height: 5' 2\" (1.575 m)     Constitutional: well-developed, no acute distress  Eyes: conjunctivae normal, no xanthelasma   Ears, Nose, Throat: oral mucosa moist, no cyanosis   CV: no JVD. Regular rate and rhythm. Normal S1S2 and no S3. No murmurs, rubs, or gallops. PMI is nondisplaced  Lungs: clear to auscultation bilaterally, normal respiratory effort without used of accessory muscles  Abdomen: soft, non-tender, bowel sounds present, no masses or hepatomegaly   Musculoskeletal: no digital clubbing, no edema   Skin: warm, no rashes     Data:    No results for input(s): WBC, HGB, HCT, PLT in the last 72 hours. No results for input(s): NA, K, CL, CO2, BUN, CREATININE, GLU, CALCIUM in the last 72 hours. Invalid input(s):  MAGNESIUM  Lab Results   Component Value Date/Time    MG 2.1 07/02/2020 04:30 AM     No results for input(s): TSH in the last 72 hours.   No results for input(s): INR in

## 2023-05-02 ENCOUNTER — OFFICE VISIT (OUTPATIENT)
Dept: NON INVASIVE DIAGNOSTICS | Age: 43
End: 2023-05-02
Payer: COMMERCIAL

## 2023-05-02 VITALS
DIASTOLIC BLOOD PRESSURE: 78 MMHG | WEIGHT: 141.8 LBS | SYSTOLIC BLOOD PRESSURE: 122 MMHG | BODY MASS INDEX: 26.09 KG/M2 | HEART RATE: 99 BPM | HEIGHT: 62 IN | RESPIRATION RATE: 16 BRPM

## 2023-05-02 DIAGNOSIS — I47.29 NSVT (NONSUSTAINED VENTRICULAR TACHYCARDIA) (HCC): Primary | ICD-10-CM

## 2023-05-02 PROCEDURE — 99215 OFFICE O/P EST HI 40 MIN: CPT | Performed by: INTERNAL MEDICINE

## 2023-05-02 PROCEDURE — 93000 ELECTROCARDIOGRAM COMPLETE: CPT | Performed by: INTERNAL MEDICINE

## 2023-05-02 RX ORDER — DEXAMETHASONE 4 MG/1
TABLET ORAL
COMMUNITY
Start: 2023-03-21

## 2023-05-02 RX ORDER — ACEBUTOLOL HYDROCHLORIDE 200 MG/1
200 CAPSULE ORAL DAILY
Qty: 90 CAPSULE | Refills: 3 | Status: SHIPPED | OUTPATIENT
Start: 2023-05-02

## 2023-05-02 RX ORDER — EPINEPHRINE 0.3 MG/.3ML
INJECTION SUBCUTANEOUS
COMMUNITY
Start: 2023-03-07

## 2023-05-02 RX ORDER — FLECAINIDE ACETATE 50 MG/1
50 TABLET ORAL 2 TIMES DAILY
Qty: 180 TABLET | Refills: 3 | Status: SHIPPED | OUTPATIENT
Start: 2023-05-02

## 2024-11-18 RX ORDER — ACEBUTOLOL HYDROCHLORIDE 200 MG/1
CAPSULE ORAL DAILY
Qty: 30 CAPSULE | Refills: 0 | Status: SHIPPED | OUTPATIENT
Start: 2024-11-18

## 2024-11-18 NOTE — TELEPHONE ENCOUNTER
Patient needs an ov, labs, and EKG prior to refill.     Electronically signed by Ginna Sterling MA on 11/18/2024 at 7:53 AM

## 2024-11-18 NOTE — TELEPHONE ENCOUNTER
Spoke with patient scheduled for 12/10/24  pt is out of medication and out of the state   Can you send in enough till appointment

## 2024-12-09 NOTE — PROGRESS NOTES
[facility] Electrophysiology  Outpatient Progress Note  Jan Navas  1980  Date of Service: 12/10/2024  PCP: No primary care provider on file.  Electrophysiologist: Belinda Felipe MD        Subjective: Jan Navas is seen for follow-up and management of: NSVT. Since her last office visit, she reports having intermittent episodes of palpitations as well as fatigue which attributes to the medications. She presents today in NSR. The patient denies any chest pain, dyspnea, dizziness, syncope, orthopnea or paroxysmal nocturnal dyspnea. Discussed with her regarding continue medical therapy versus EP study with RF ablation of PVC/NSVT. She opts for EP study and possible ablation.    Patient Active Problem List   Diagnosis    V-tach (HCC)    Palpitations    Abnormal EKG    Abnormal CXR       Current Outpatient Medications   Medication Sig Dispense Refill    acebutolol (SECTRAL) 200 MG capsule TAKE 1 CAPSULE BY MOUTH EVERY DAY 30 capsule 0    EPINEPHrine (EPIPEN) 0.3 MG/0.3ML SOAJ injection USE AS DIRECTED AS NEEDED      FLOVENT  MCG/ACT inhaler INHALE 2 PUFFS INTO THE LUNGS TWICE DAY      flecainide (TAMBOCOR) 50 MG tablet Take 1 tablet by mouth 2 times daily 180 tablet 3    chlorpheniramine (ALLER-CHLOR) 4 MG tablet Take 1 tablet by mouth every 6 hours as needed for Allergies 20 tablet 0     No current facility-administered medications for this visit.        No Known Allergies    ROS:   Constitutional: Negative for fever, activity change and appetite change.   HENT: Negative for epistaxis.   Eyes: Negative for diploplia, blurred vision.   Respiratory: Negative for cough, chest tightness, shortness of breath and wheezing.   Cardiovascular: pertinent positives in HPI  Gastrointestinal: Negative for abdominal pain and blood in stool.   All other review of systems are negative     PHYSICAL EXAM:      Vitals:    12/10/24 0834   BP: 108/68   Pulse: 70

## 2024-12-10 ENCOUNTER — OFFICE VISIT (OUTPATIENT)
Dept: NON INVASIVE DIAGNOSTICS | Age: 44
End: 2024-12-10
Payer: COMMERCIAL

## 2024-12-10 VITALS
DIASTOLIC BLOOD PRESSURE: 68 MMHG | OXYGEN SATURATION: 98 % | RESPIRATION RATE: 18 BRPM | HEIGHT: 61 IN | BODY MASS INDEX: 24.17 KG/M2 | WEIGHT: 128 LBS | HEART RATE: 70 BPM | TEMPERATURE: 97.3 F | SYSTOLIC BLOOD PRESSURE: 108 MMHG

## 2024-12-10 DIAGNOSIS — Z51.81 ENCOUNTER FOR MONITORING FLECAINIDE THERAPY: ICD-10-CM

## 2024-12-10 DIAGNOSIS — Z79.899 ENCOUNTER FOR MONITORING FLECAINIDE THERAPY: ICD-10-CM

## 2024-12-10 DIAGNOSIS — I47.29 NSVT (NONSUSTAINED VENTRICULAR TACHYCARDIA) (HCC): Primary | ICD-10-CM

## 2024-12-10 LAB
ALBUMIN: 4.2 G/DL (ref 3.5–5.2)
ALP BLD-CCNC: 66 U/L (ref 35–104)
ALT SERPL-CCNC: 13 U/L (ref 0–32)
ANION GAP SERPL CALCULATED.3IONS-SCNC: 13 MMOL/L (ref 7–16)
AST SERPL-CCNC: 22 U/L (ref 0–31)
BILIRUB SERPL-MCNC: 0.4 MG/DL (ref 0–1.2)
BUN BLDV-MCNC: 9 MG/DL (ref 6–20)
CALCIUM SERPL-MCNC: 9.3 MG/DL (ref 8.6–10.2)
CHLORIDE BLD-SCNC: 101 MMOL/L (ref 98–107)
CO2: 24 MMOL/L (ref 22–29)
CREAT SERPL-MCNC: 0.7 MG/DL (ref 0.5–1)
GFR, ESTIMATED: >90 ML/MIN/1.73M2
GLUCOSE BLD-MCNC: 95 MG/DL (ref 74–99)
HCT VFR BLD CALC: 43.9 % (ref 34–48)
HEMOGLOBIN: 14.2 G/DL (ref 11.5–15.5)
MCH RBC QN AUTO: 30.5 PG (ref 26–35)
MCHC RBC AUTO-ENTMCNC: 32.3 G/DL (ref 32–34.5)
MCV RBC AUTO: 94.2 FL (ref 80–99.9)
PDW BLD-RTO: 11.6 % (ref 11.5–15)
PLATELET # BLD: 394 K/UL (ref 130–450)
PMV BLD AUTO: 10.9 FL (ref 7–12)
POTASSIUM SERPL-SCNC: 4.3 MMOL/L (ref 3.5–5)
RBC # BLD: 4.66 M/UL (ref 3.5–5.5)
SODIUM BLD-SCNC: 138 MMOL/L (ref 132–146)
TOTAL PROTEIN: 7.6 G/DL (ref 6.4–8.3)
WBC # BLD: 8.1 K/UL (ref 4.5–11.5)

## 2024-12-10 PROCEDURE — 93000 ELECTROCARDIOGRAM COMPLETE: CPT | Performed by: INTERNAL MEDICINE

## 2024-12-10 PROCEDURE — 36415 COLL VENOUS BLD VENIPUNCTURE: CPT | Performed by: INTERNAL MEDICINE

## 2024-12-10 PROCEDURE — 99215 OFFICE O/P EST HI 40 MIN: CPT | Performed by: INTERNAL MEDICINE

## 2024-12-17 ENCOUNTER — TELEPHONE (OUTPATIENT)
Dept: NON INVASIVE DIAGNOSTICS | Age: 44
End: 2024-12-17

## 2024-12-17 ENCOUNTER — PREP FOR PROCEDURE (OUTPATIENT)
Dept: NON INVASIVE DIAGNOSTICS | Age: 44
End: 2024-12-17

## 2024-12-17 RX ORDER — SODIUM CHLORIDE 0.9 % (FLUSH) 0.9 %
5-40 SYRINGE (ML) INJECTION PRN
OUTPATIENT
Start: 2024-12-17

## 2024-12-17 RX ORDER — SODIUM CHLORIDE 9 MG/ML
INJECTION, SOLUTION INTRAVENOUS PRN
OUTPATIENT
Start: 2024-12-17

## 2024-12-17 RX ORDER — SODIUM CHLORIDE 0.9 % (FLUSH) 0.9 %
5-40 SYRINGE (ML) INJECTION EVERY 12 HOURS SCHEDULED
OUTPATIENT
Start: 2024-12-17

## 2024-12-17 NOTE — TELEPHONE ENCOUNTER
Please check prior auth.    Date:3/10/25    Doc:Matteo    Procedure:  EPS/SVT Ablation    CPT: 55039 and 70229    ICD: I47.1    Electronically signed by Ginna Sterling MA on 12/17/2024 at 11:11 AM

## 2025-01-03 RX ORDER — ACEBUTOLOL HYDROCHLORIDE 200 MG/1
200 CAPSULE ORAL DAILY
Qty: 30 CAPSULE | Refills: 5 | Status: SHIPPED | OUTPATIENT
Start: 2025-01-03

## 2025-01-31 RX ORDER — ACEBUTOLOL HYDROCHLORIDE 200 MG/1
200 CAPSULE ORAL DAILY
Qty: 90 CAPSULE | Refills: 1 | Status: SHIPPED | OUTPATIENT
Start: 2025-01-31

## 2025-03-06 ENCOUNTER — TELEPHONE (OUTPATIENT)
Dept: NON INVASIVE DIAGNOSTICS | Age: 45
End: 2025-03-06

## 2025-03-06 NOTE — TELEPHONE ENCOUNTER
Please check prior auth. Wrong ablation type was sent to you the first time.     Date:3/10/2025    Doc:Khunnawat     Procedure:  PVC ablation (EPS was approved already)    CPT: 74448    ICD: I49.3    Electronically signed by Ginna Sterling MA on 3/6/2025 at 9:48 AM

## 2025-03-07 NOTE — TELEPHONE ENCOUNTER
Per Oliverio at [facility], no prior authorization needed for CPT 62649.  Call reference:  Oliverio 03/07/2025

## 2025-03-07 NOTE — H&P
[facility]  History and Physical Examination  Jan Navas  1980  Date of Service: 3/10/2025  PCP: No primary care provider on file.  Electrophysiologist: Belinda Felipe MD        Subjective: Jan Navas is seenin the [facility] for EP study with possible PVC ablation. In 2020, she went to the urgent care with palpitations and was found to have some nonsustained VT and was brought to the emergency room. Her EKG, PVC appeared to have a left bundle branch morphology, inferior axis, baseline EKG showed sinus rhythm QTc 425 ms.  She received 150 mg amiodarone bolus and then was placed on lidocaine drip and continued to have some nonsustained VT. She underwent a left heart cath in 7/2020 with Dr. Lockett that did not reveal any significant coronary coronary artery disease.  She was placed on Toprol XL 25 mg twice daily without recurrent nonsustained VT.  An echocardiogram showed a structurally normal heart. She wore a 24 hour HM that showed a PVC burden of 2.3%. She was on Cardizem but now is on Acebutolol for suppression. She continued to reports ongoing palpitations, and was started on Flecainide 50mg BID for suppression and up titrated to 100mg BID. She self reduced the dose to 50 mg BID. During her last office visit, she reported having intermittent episodes of palpitations as well as fatigue which attributes to the medications. She presents today in NSR. The patient denies any chest pain, dyspnea, dizziness, syncope, orthopnea or paroxysmal nocturnal dyspnea. Discussed with her regarding continue medical therapy versus EP study with RF ablation of PVC/NSVT. She opts for EP study and possible ablation.    Patient Active Problem List   Diagnosis    V-tach (HCC)    Palpitations    Abnormal EKG    Abnormal CXR     No family history on file.    Social History     Socioeconomic History    Marital status:      Spouse name: Not on file

## 2025-03-10 ENCOUNTER — ANESTHESIA EVENT (OUTPATIENT)
Age: 45
End: 2025-03-10
Payer: COMMERCIAL

## 2025-03-10 ENCOUNTER — HOSPITAL ENCOUNTER (OUTPATIENT)
Age: 45
Setting detail: OBSERVATION
Discharge: HOME | End: 2025-03-10
Attending: INTERNAL MEDICINE | Admitting: INTERNAL MEDICINE
Payer: COMMERCIAL

## 2025-03-10 ENCOUNTER — ANESTHESIA (OUTPATIENT)
Age: 45
End: 2025-03-10
Payer: COMMERCIAL

## 2025-03-10 VITALS
TEMPERATURE: 97 F | HEIGHT: 61 IN | BODY MASS INDEX: 24.55 KG/M2 | SYSTOLIC BLOOD PRESSURE: 122 MMHG | DIASTOLIC BLOOD PRESSURE: 83 MMHG | OXYGEN SATURATION: 97 % | HEART RATE: 82 BPM | WEIGHT: 130 LBS | RESPIRATION RATE: 18 BRPM

## 2025-03-10 DIAGNOSIS — I47.10 PAROXYSMAL SUPRAVENTRICULAR TACHYCARDIA: ICD-10-CM

## 2025-03-10 DIAGNOSIS — I49.3 VENTRICULAR PREMATURE BEATS: Primary | ICD-10-CM

## 2025-03-10 PROBLEM — Z98.890 HISTORY OF CARDIAC RADIOFREQUENCY ABLATION: Status: ACTIVE | Noted: 2025-03-10

## 2025-03-10 LAB
ANION GAP SERPL CALCULATED.3IONS-SCNC: 13 MMOL/L (ref 7–16)
BUN SERPL-MCNC: 12 MG/DL (ref 6–20)
CALCIUM SERPL-MCNC: 8.8 MG/DL (ref 8.6–10.2)
CHLORIDE SERPL-SCNC: 104 MMOL/L (ref 98–107)
CO2 SERPL-SCNC: 22 MMOL/L (ref 22–29)
CREAT SERPL-MCNC: 0.7 MG/DL (ref 0.5–1)
ECHO BSA: 1.59 M2
ERYTHROCYTE [DISTWIDTH] IN BLOOD BY AUTOMATED COUNT: 11.9 % (ref 11.5–15)
GFR, ESTIMATED: >90 ML/MIN/1.73M2
GLUCOSE SERPL-MCNC: 96 MG/DL (ref 74–99)
HCG SERPL QL: NEGATIVE
HCT VFR BLD AUTO: 42.4 % (ref 34–48)
HGB BLD-MCNC: 14.2 G/DL (ref 11.5–15.5)
MAGNESIUM SERPL-MCNC: 1.9 MG/DL (ref 1.6–2.6)
MCH RBC QN AUTO: 31.1 PG (ref 26–35)
MCHC RBC AUTO-ENTMCNC: 33.5 G/DL (ref 32–34.5)
MCV RBC AUTO: 93 FL (ref 80–99.9)
PLATELET # BLD AUTO: 358 K/UL (ref 130–450)
PMV BLD AUTO: 9.8 FL (ref 7–12)
POTASSIUM SERPL-SCNC: 3.8 MMOL/L (ref 3.5–5)
RBC # BLD AUTO: 4.56 M/UL (ref 3.5–5.5)
SODIUM SERPL-SCNC: 139 MMOL/L (ref 132–146)
WBC OTHER # BLD: 8.6 K/UL (ref 4.5–11.5)

## 2025-03-10 PROCEDURE — C1894 INTRO/SHEATH, NON-LASER: HCPCS | Performed by: INTERNAL MEDICINE

## 2025-03-10 PROCEDURE — 6360000002 HC RX W HCPCS: Performed by: INTERNAL MEDICINE

## 2025-03-10 PROCEDURE — C1732 CATH, EP, DIAG/ABL, 3D/VECT: HCPCS | Performed by: INTERNAL MEDICINE

## 2025-03-10 PROCEDURE — 93005 ELECTROCARDIOGRAM TRACING: CPT | Performed by: INTERNAL MEDICINE

## 2025-03-10 PROCEDURE — 2580000003 HC RX 258: Performed by: INTERNAL MEDICINE

## 2025-03-10 PROCEDURE — 3700000001 HC ADD 15 MINUTES (ANESTHESIA): Performed by: INTERNAL MEDICINE

## 2025-03-10 PROCEDURE — 85027 COMPLETE CBC AUTOMATED: CPT

## 2025-03-10 PROCEDURE — 83735 ASSAY OF MAGNESIUM: CPT

## 2025-03-10 PROCEDURE — 3700000000 HC ANESTHESIA ATTENDED CARE: Performed by: INTERNAL MEDICINE

## 2025-03-10 PROCEDURE — C1759 CATH, INTRA ECHOCARDIOGRAPHY: HCPCS | Performed by: INTERNAL MEDICINE

## 2025-03-10 PROCEDURE — G0378 HOSPITAL OBSERVATION PER HR: HCPCS

## 2025-03-10 PROCEDURE — 6360000002 HC RX W HCPCS: Performed by: ANESTHESIOLOGIST ASSISTANT

## 2025-03-10 PROCEDURE — 93654 COMPRE EP EVAL TX VT: CPT | Performed by: INTERNAL MEDICINE

## 2025-03-10 PROCEDURE — C1769 GUIDE WIRE: HCPCS | Performed by: INTERNAL MEDICINE

## 2025-03-10 PROCEDURE — 2720000010 HC SURG SUPPLY STERILE: Performed by: INTERNAL MEDICINE

## 2025-03-10 PROCEDURE — 84703 CHORIONIC GONADOTROPIN ASSAY: CPT

## 2025-03-10 PROCEDURE — 2709999900 HC NON-CHARGEABLE SUPPLY: Performed by: INTERNAL MEDICINE

## 2025-03-10 PROCEDURE — 2580000003 HC RX 258: Performed by: ANESTHESIOLOGIST ASSISTANT

## 2025-03-10 PROCEDURE — 93662 INTRACARDIAC ECG (ICE): CPT | Performed by: INTERNAL MEDICINE

## 2025-03-10 PROCEDURE — 80048 BASIC METABOLIC PNL TOTAL CA: CPT

## 2025-03-10 RX ORDER — SODIUM CHLORIDE 9 MG/ML
INJECTION, SOLUTION INTRAVENOUS PRN
Status: DISCONTINUED | OUTPATIENT
Start: 2025-03-10 | End: 2025-03-10 | Stop reason: HOSPADM

## 2025-03-10 RX ORDER — SODIUM CHLORIDE 0.9 % (FLUSH) 0.9 %
5-40 SYRINGE (ML) INJECTION EVERY 12 HOURS SCHEDULED
Status: DISCONTINUED | OUTPATIENT
Start: 2025-03-10 | End: 2025-03-10 | Stop reason: HOSPADM

## 2025-03-10 RX ORDER — SODIUM CHLORIDE 0.9 % (FLUSH) 0.9 %
5-40 SYRINGE (ML) INJECTION PRN
Status: DISCONTINUED | OUTPATIENT
Start: 2025-03-10 | End: 2025-03-10 | Stop reason: HOSPADM

## 2025-03-10 RX ORDER — SODIUM CHLORIDE 9 MG/ML
INJECTION, SOLUTION INTRAVENOUS
Status: DISCONTINUED | OUTPATIENT
Start: 2025-03-10 | End: 2025-03-10 | Stop reason: SDUPTHER

## 2025-03-10 RX ORDER — FENTANYL CITRATE 50 UG/ML
INJECTION, SOLUTION INTRAMUSCULAR; INTRAVENOUS
Status: DISCONTINUED | OUTPATIENT
Start: 2025-03-10 | End: 2025-03-10 | Stop reason: SDUPTHER

## 2025-03-10 RX ORDER — MIDAZOLAM HYDROCHLORIDE 1 MG/ML
INJECTION, SOLUTION INTRAMUSCULAR; INTRAVENOUS
Status: DISCONTINUED | OUTPATIENT
Start: 2025-03-10 | End: 2025-03-10 | Stop reason: SDUPTHER

## 2025-03-10 RX ADMIN — MIDAZOLAM 1 MG: 1 INJECTION INTRAMUSCULAR; INTRAVENOUS at 08:09

## 2025-03-10 RX ADMIN — FENTANYL CITRATE 25 MCG: 50 INJECTION, SOLUTION INTRAMUSCULAR; INTRAVENOUS at 11:38

## 2025-03-10 RX ADMIN — FENTANYL CITRATE 25 MCG: 50 INJECTION, SOLUTION INTRAMUSCULAR; INTRAVENOUS at 09:08

## 2025-03-10 RX ADMIN — FENTANYL CITRATE 25 MCG: 50 INJECTION, SOLUTION INTRAMUSCULAR; INTRAVENOUS at 08:09

## 2025-03-10 RX ADMIN — MIDAZOLAM 0.5 MG: 1 INJECTION INTRAMUSCULAR; INTRAVENOUS at 08:14

## 2025-03-10 RX ADMIN — SODIUM CHLORIDE: 9 INJECTION, SOLUTION INTRAVENOUS at 07:47

## 2025-03-10 RX ADMIN — MIDAZOLAM 0.5 MG: 1 INJECTION INTRAMUSCULAR; INTRAVENOUS at 08:43

## 2025-03-10 ASSESSMENT — ENCOUNTER SYMPTOMS: SHORTNESS OF BREATH: 1

## 2025-03-10 NOTE — ANESTHESIA PRE PROCEDURE
Department of Anesthesiology  Preprocedure Note       Name:  Jan Navas   Age:  44 y.o.  :  1980                                          MRN:  [id number]         Date:  3/10/2025      Surgeon: Surgeon(s):  Belinda Felipe MD    Procedure: Procedure(s):  Ep study complete  Ablation PVC    Medications prior to admission:   Prior to Admission medications    Medication Sig Start Date End Date Taking? Authorizing Provider   flecainide (TAMBOCOR) 50 MG tablet Take 1 tablet by mouth 2 times daily 23  Yes Belinda Felipe MD   acebutolol (SECTRAL) 200 MG capsule Take 1 capsule by mouth daily 25   Michelle Torrez APRN - CNP   EPINEPHrine (EPIPEN) 0.3 MG/0.3ML SOAJ injection USE AS DIRECTED AS NEEDED 3/7/23   ProviderRosalba MD   FLOVENT  MCG/ACT inhaler INHALE 2 PUFFS INTO THE LUNGS TWICE DAY 3/21/23   ProviderRosalba MD   chlorpheniramine (ALLER-CHLOR) 4 MG tablet Take 1 tablet by mouth every 6 hours as needed for Allergies 3/5/21   Chapo Gambino III, PA       Current medications:    Current Facility-Administered Medications   Medication Dose Route Frequency Provider Last Rate Last Admin    sodium chloride flush 0.9 % injection 5-40 mL  5-40 mL IntraVENous 2 times per day eBlinda Felipe MD        sodium chloride flush 0.9 % injection 5-40 mL  5-40 mL IntraVENous PRN Belinda Felipe MD        0.9 % sodium chloride infusion   IntraVENous PRN Belinda Felipe MD           Allergies:    Allergies   Allergen Reactions    Shellfish Allergy Rash       Problem List:    Patient Active Problem List   Diagnosis Code    V-tach (HCC) I47.20    Palpitations R00.2    Abnormal EKG R94.31    Abnormal CXR R93.89       Past Medical History:        Diagnosis Date    V-tach (HCC)        Past Surgical History:        Procedure Laterality Date    CARDIAC CATHETERIZATION  2020    Dr. Lockett       Social History:    Social History     Tobacco Use    Smoking status: Never

## 2025-03-10 NOTE — ANESTHESIA POSTPROCEDURE EVALUATION
Department of Anesthesiology  Postprocedure Note    Patient: Jan Navas  MRN: [id number]  YOB: 1980  Date of evaluation: 3/10/2025    Procedure Summary       Date: 03/10/25 Room / Location: [facility] / [facility] CARDIAC CATH LAB    Anesthesia Start: 0747 Anesthesia Stop: 1206    Procedures:       Ep study complete      Ablation PVC Diagnosis:       Ventricular premature beats      (Ventricular premature beats [I49.3])    Providers: Belinda Felipe MD Responsible Provider: Jessica Garibay MD    Anesthesia Type: MAC ASA Status: 3            Anesthesia Type: No value filed.    Caleb Phase I: Caleb Score: 10    Caleb Phase II:      Anesthesia Post Evaluation    Patient location during evaluation: PACU  Patient participation: complete - patient participated  Level of consciousness: awake and alert  Airway patency: patent  Nausea & Vomiting: no nausea and no vomiting  Cardiovascular status: blood pressure returned to baseline and hemodynamically stable  Respiratory status: acceptable and spontaneous ventilation  Hydration status: euvolemic  Multimodal analgesia pain management approach  Pain management: adequate    No notable events documented.

## 2025-03-10 NOTE — DISCHARGE INSTRUCTIONS
Ablation Patient Discharge Instructions      Medications: ***    Follow-Up: You will be seen on Monday 3/17/25 at 1:00 pm for an EKG and on Friday 5/2/25 at 9:00 am for a follow-up evaluation. Please call the [facility] office if you need to reschedule this appointment; [phone].     Questions/Concerns: It is not uncommon for patients to experience brief episodes of palpitations, but please call the [facility] office if you are having frequent symptoms.     Incision Care: Check bilateral groins daily. No soaking in a bathtub, hot tub or pool for one week. You may shower.  If you find any redness, swelling, drainage, warmth, or have a fever greater than 100 degrees, notify the office immediately at [phone].     Activity: No lifting greater than 10 lbs for 5 days, and no strenuous exercise for 2 weeks. You may drive if you feel up to it. DO NOT drive until you have stopped taking prescription pain medication.    [facility]:   [address] [place]  [address]  [facility]   [phone]

## 2025-03-11 NOTE — PROGRESS NOTES
Discharge to home. Monitor removed and placed in nurses station. Extended Stay Recovery Discharge Documentation    Final procedure site check completed, stable for discharge- Yes  Ambulated without issue post recovery completion, gait steady.   Peripheral IV sites removed (see IV Flowsheet) and site asymptomatic- Yes  Patient dressed self without assistance.   Discharge instructions reviewed with Patient and verbalized understanding.   Patient discharged Home with spouse at 5 PM  Monitor cleaned and placed back in nurses' station- Yes          
Patient received the Sacrament of the Anointing of the Sick on March 10, 2025, by Father Sage Ortiz.    If additional support is requested or needed please reach out to Spiritual Health ([phone]).    Chap. Ruperto Hillman MDIV, BCC    
Reminded patient of scheduled procedure on 3/10.  Instructions given.    
no redness

## 2025-03-12 LAB
EKG ATRIAL RATE: 81 BPM
EKG P AXIS: 75 DEGREES
EKG P-R INTERVAL: 172 MS
EKG Q-T INTERVAL: 404 MS
EKG QRS DURATION: 92 MS
EKG QTC CALCULATION (BAZETT): 469 MS
EKG R AXIS: 52 DEGREES
EKG T AXIS: 75 DEGREES
EKG VENTRICULAR RATE: 81 BPM

## 2025-03-17 ENCOUNTER — NURSE ONLY (OUTPATIENT)
Dept: NON INVASIVE DIAGNOSTICS | Age: 45
End: 2025-03-17
Payer: COMMERCIAL

## 2025-03-17 VITALS
HEART RATE: 70 BPM | RESPIRATION RATE: 16 BRPM | SYSTOLIC BLOOD PRESSURE: 138 MMHG | WEIGHT: 129 LBS | HEIGHT: 61 IN | BODY MASS INDEX: 24.35 KG/M2 | DIASTOLIC BLOOD PRESSURE: 80 MMHG

## 2025-03-17 DIAGNOSIS — I47.20 V-TACH (HCC): Primary | ICD-10-CM

## 2025-03-17 PROCEDURE — 93000 ELECTROCARDIOGRAM COMPLETE: CPT | Performed by: INTERNAL MEDICINE

## 2025-03-17 NOTE — PROGRESS NOTES
Patient was in for ekg today per Dr. Felipe    EKG Pulse: 70        Mical Gracia, CMA      
Dr Crocker

## 2025-04-30 NOTE — PROGRESS NOTES
[facility] [place] [facility]  Outpatient Progress Note  Jan Navas  1980  Date of Service: 5/2/2025  PCP: Jono Em DO  Electrophysiologist: Dr. Felipe             Subjective: Jan Navas is seen for follow-up and management of: NSVT /PVC    Last seen in the office with Dr. Felipe on 12/10/24    PMH as noted below significant for NSVT. Last OV, she reported having intermittent episodes of palpitations as well as fatigue which attributed to the medications. She presents today in NSR .  The patient denies any chest pain, dyspnea, dizziness, syncope, orthopnea or paroxysmal nocturnal dyspnea. Last OV discussed with her regarding continue medical therapy versus EP study with RF ablation of PVC/NSVT. She opted for EP study and possible ablation. Patient underwent a successful RFA ablation of RVOT tachycardia and PVC ablation on March 10TH 2025 with . Patient tolerated procedure well. The patient has no complaints today and is feeling well from an EP POV.      Patient Active Problem List    Diagnosis Date Noted    History of cardiac radiofrequency ablation 03/10/2025     Priority: High    Ventricular premature beats 03/10/2025    Palpitations     Abnormal EKG     Abnormal CXR     V-tach ([facility]) 06/30/2020       Past Medical History:   Diagnosis Date    V-tach ([facility])        History reviewed. No pertinent family history.  There is no family history of sudden cardiac arrest    Social History     Tobacco Use    Smoking status: Never    Smokeless tobacco: Never   Substance Use Topics    Alcohol use: Yes     Comment: sometimes drinks wine       Current Outpatient Medications   Medication Sig Dispense Refill    EPINEPHrine (EPIPEN) 0.3 MG/0.3ML SOAJ injection USE AS DIRECTED AS NEEDED      FLOVENT  MCG/ACT inhaler INHALE 2 PUFFS INTO THE LUNGS TWICE DAY      chlorpheniramine (ALLER-CHLOR) 4 MG tablet Take 1 tablet by mouth every 6 hours

## 2025-05-02 ENCOUNTER — OFFICE VISIT (OUTPATIENT)
Dept: NON INVASIVE DIAGNOSTICS | Age: 45
End: 2025-05-02
Payer: COMMERCIAL

## 2025-05-02 VITALS
OXYGEN SATURATION: 96 % | HEART RATE: 76 BPM | WEIGHT: 130.2 LBS | DIASTOLIC BLOOD PRESSURE: 90 MMHG | RESPIRATION RATE: 16 BRPM | SYSTOLIC BLOOD PRESSURE: 128 MMHG | TEMPERATURE: 97.5 F | BODY MASS INDEX: 26.25 KG/M2 | HEIGHT: 59 IN

## 2025-05-02 DIAGNOSIS — I49.3 PVC (PREMATURE VENTRICULAR CONTRACTION): ICD-10-CM

## 2025-05-02 DIAGNOSIS — Z98.890 HISTORY OF CARDIAC RADIOFREQUENCY ABLATION: ICD-10-CM

## 2025-05-02 DIAGNOSIS — I47.29 RVOT VENTRICULAR TACHYCARDIA (HCC): ICD-10-CM

## 2025-05-02 DIAGNOSIS — I49.9 IRREGULAR HEART BEAT: Primary | ICD-10-CM

## 2025-05-02 PROCEDURE — 93000 ELECTROCARDIOGRAM COMPLETE: CPT | Performed by: INTERNAL MEDICINE

## 2025-05-02 PROCEDURE — 99214 OFFICE O/P EST MOD 30 MIN: CPT | Performed by: NURSE PRACTITIONER

## (undated) DEVICE — CATHETER ABLAT 8FR L115CM 1-6-2MM SPC TIP 3.5MM DF CRV

## (undated) DEVICE — STERILE (15.2 TAPERED TO 7.6 X 183CM) POLYETHYLENE ACCORDION-FOLDED COVER FOR USE WITH SIEMENS ACUNAV ULTRASOUND CATHETER FAMILY CONNECTOR: Brand: SWIFTLINK TRANSDUCER COVER

## (undated) DEVICE — GUIDEWIRE VASCULAR L145CM 0.035IN J TIP L3MM PTFE FIX COR NAMIC

## (undated) DEVICE — TRAY SURG CUST EPIDURAL SEHC

## (undated) DEVICE — TUBING PRSS MON L48IN PVC RIG NONEXPANDING M TO FEM CONN

## (undated) DEVICE — PATCH REF EXT FOR CARTO 3 SYS (EA = 6 PACKS)

## (undated) DEVICE — PINNACLE INTRODUCER SHEATH: Brand: PINNACLE

## (undated) DEVICE — PAD, DEFIB, ADULT, RADIOTRAN, PHYSIO, LO: Brand: MEDLINE

## (undated) DEVICE — SOUNDSTAR REPROC ECO 8F DGNSTC ULTRSND CATH USE GE IMGNG SSTM